# Patient Record
Sex: MALE | Race: WHITE | NOT HISPANIC OR LATINO | Employment: OTHER | URBAN - METROPOLITAN AREA
[De-identification: names, ages, dates, MRNs, and addresses within clinical notes are randomized per-mention and may not be internally consistent; named-entity substitution may affect disease eponyms.]

---

## 2017-02-01 ENCOUNTER — OFFICE VISIT - HEALTHEAST (OUTPATIENT)
Dept: FAMILY MEDICINE | Facility: CLINIC | Age: 62
End: 2017-02-01

## 2017-02-01 ENCOUNTER — COMMUNICATION - HEALTHEAST (OUTPATIENT)
Dept: FAMILY MEDICINE | Facility: CLINIC | Age: 62
End: 2017-02-01

## 2017-02-01 DIAGNOSIS — J40 BRONCHITIS: ICD-10-CM

## 2017-02-01 DIAGNOSIS — R53.82 CHRONIC FATIGUE: ICD-10-CM

## 2017-02-01 DIAGNOSIS — J98.01 BRONCHOSPASM: ICD-10-CM

## 2017-02-01 DIAGNOSIS — S46.212A STRAIN OF BICEPS TENDON, LEFT, INITIAL ENCOUNTER: ICD-10-CM

## 2017-04-19 ENCOUNTER — COMMUNICATION - HEALTHEAST (OUTPATIENT)
Dept: CARDIOLOGY | Facility: CLINIC | Age: 62
End: 2017-04-19

## 2017-04-21 ENCOUNTER — COMMUNICATION - HEALTHEAST (OUTPATIENT)
Dept: FAMILY MEDICINE | Facility: CLINIC | Age: 62
End: 2017-04-21

## 2017-04-21 DIAGNOSIS — E78.5 HYPERLIPEMIA: ICD-10-CM

## 2017-05-17 ENCOUNTER — OFFICE VISIT - HEALTHEAST (OUTPATIENT)
Dept: FAMILY MEDICINE | Facility: CLINIC | Age: 62
End: 2017-05-17

## 2017-05-17 ENCOUNTER — COMMUNICATION - HEALTHEAST (OUTPATIENT)
Dept: CARDIOLOGY | Facility: CLINIC | Age: 62
End: 2017-05-17

## 2017-05-17 DIAGNOSIS — I10 ESSENTIAL HYPERTENSION: ICD-10-CM

## 2017-05-17 ASSESSMENT — MIFFLIN-ST. JEOR: SCORE: 1538.3

## 2017-05-19 ENCOUNTER — OFFICE VISIT - HEALTHEAST (OUTPATIENT)
Dept: FAMILY MEDICINE | Facility: CLINIC | Age: 62
End: 2017-05-19

## 2017-05-19 DIAGNOSIS — M77.10: ICD-10-CM

## 2017-08-21 ENCOUNTER — OFFICE VISIT - HEALTHEAST (OUTPATIENT)
Dept: CARDIOLOGY | Facility: CLINIC | Age: 62
End: 2017-08-21

## 2017-08-21 DIAGNOSIS — I10 ESSENTIAL HYPERTENSION WITH GOAL BLOOD PRESSURE LESS THAN 130/80: ICD-10-CM

## 2017-08-21 DIAGNOSIS — G47.33 OBSTRUCTIVE SLEEP APNEA (ADULT) (PEDIATRIC): ICD-10-CM

## 2017-08-21 DIAGNOSIS — E11.9 TYPE 2 DIABETES MELLITUS WITHOUT COMPLICATION, WITHOUT LONG-TERM CURRENT USE OF INSULIN (H): ICD-10-CM

## 2017-08-21 DIAGNOSIS — E78.00 HYPERCHOLESTEREMIA: ICD-10-CM

## 2017-08-21 DIAGNOSIS — I25.83 CORONARY ATHEROSCLEROSIS DUE TO LIPID RICH PLAQUE: ICD-10-CM

## 2017-08-21 ASSESSMENT — MIFFLIN-ST. JEOR: SCORE: 1511.08

## 2017-08-25 ENCOUNTER — AMBULATORY - HEALTHEAST (OUTPATIENT)
Dept: CARDIOLOGY | Facility: CLINIC | Age: 62
End: 2017-08-25

## 2017-08-25 ENCOUNTER — COMMUNICATION - HEALTHEAST (OUTPATIENT)
Dept: CARDIOLOGY | Facility: CLINIC | Age: 62
End: 2017-08-25

## 2017-08-25 DIAGNOSIS — E78.00 HYPERCHOLESTEREMIA: ICD-10-CM

## 2017-08-25 DIAGNOSIS — E11.9 TYPE 2 DIABETES MELLITUS WITHOUT COMPLICATION, WITHOUT LONG-TERM CURRENT USE OF INSULIN (H): ICD-10-CM

## 2017-08-25 DIAGNOSIS — I25.83 CORONARY ATHEROSCLEROSIS DUE TO LIPID RICH PLAQUE: ICD-10-CM

## 2017-08-25 LAB
CHOLEST SERPL-MCNC: 164 MG/DL
FASTING STATUS PATIENT QL REPORTED: YES
HBA1C MFR BLD: 6.4 % (ref 4.2–6.1)
HDLC SERPL-MCNC: 34 MG/DL
LDLC SERPL CALC-MCNC: 99 MG/DL
TRIGL SERPL-MCNC: 157 MG/DL

## 2017-09-20 ENCOUNTER — COMMUNICATION - HEALTHEAST (OUTPATIENT)
Dept: FAMILY MEDICINE | Facility: CLINIC | Age: 62
End: 2017-09-20

## 2017-09-20 DIAGNOSIS — E78.5 HYPERLIPEMIA: ICD-10-CM

## 2018-05-18 ENCOUNTER — COMMUNICATION - HEALTHEAST (OUTPATIENT)
Dept: FAMILY MEDICINE | Facility: CLINIC | Age: 63
End: 2018-05-18

## 2018-05-18 DIAGNOSIS — R05.9 COUGH: ICD-10-CM

## 2018-05-23 ENCOUNTER — AMBULATORY - HEALTHEAST (OUTPATIENT)
Dept: FAMILY MEDICINE | Facility: CLINIC | Age: 63
End: 2018-05-23

## 2018-05-23 ENCOUNTER — OFFICE VISIT - HEALTHEAST (OUTPATIENT)
Dept: FAMILY MEDICINE | Facility: CLINIC | Age: 63
End: 2018-05-23

## 2018-05-23 ENCOUNTER — COMMUNICATION - HEALTHEAST (OUTPATIENT)
Dept: FAMILY MEDICINE | Facility: CLINIC | Age: 63
End: 2018-05-23

## 2018-05-23 DIAGNOSIS — E78.00 HYPERCHOLESTEREMIA: ICD-10-CM

## 2018-05-23 DIAGNOSIS — E11.9 DIABETES (H): ICD-10-CM

## 2018-05-23 DIAGNOSIS — E11.9 TYPE 2 DIABETES MELLITUS WITHOUT COMPLICATION, WITHOUT LONG-TERM CURRENT USE OF INSULIN (H): ICD-10-CM

## 2018-05-23 DIAGNOSIS — I10 ESSENTIAL HYPERTENSION: ICD-10-CM

## 2018-05-23 LAB
ALBUMIN SERPL-MCNC: 4 G/DL (ref 3.5–5)
ALP SERPL-CCNC: 55 U/L (ref 45–120)
ALT SERPL W P-5'-P-CCNC: 34 U/L (ref 0–45)
ANION GAP SERPL CALCULATED.3IONS-SCNC: 9 MMOL/L (ref 5–18)
AST SERPL W P-5'-P-CCNC: 22 U/L (ref 0–40)
BILIRUB SERPL-MCNC: 1.1 MG/DL (ref 0–1)
BUN SERPL-MCNC: 17 MG/DL (ref 8–22)
CALCIUM SERPL-MCNC: 9.6 MG/DL (ref 8.5–10.5)
CHLORIDE BLD-SCNC: 108 MMOL/L (ref 98–107)
CHOLEST SERPL-MCNC: 136 MG/DL
CO2 SERPL-SCNC: 26 MMOL/L (ref 22–31)
CREAT SERPL-MCNC: 1.12 MG/DL (ref 0.7–1.3)
FASTING STATUS PATIENT QL REPORTED: YES
GFR SERPL CREATININE-BSD FRML MDRD: >60 ML/MIN/1.73M2
GLUCOSE BLD-MCNC: 122 MG/DL (ref 70–125)
HBA1C MFR BLD: 6.7 % (ref 3.5–6)
HDLC SERPL-MCNC: 30 MG/DL
LDLC SERPL CALC-MCNC: 84 MG/DL
POTASSIUM BLD-SCNC: 4.7 MMOL/L (ref 3.5–5)
PROT SERPL-MCNC: 7.1 G/DL (ref 6–8)
SODIUM SERPL-SCNC: 143 MMOL/L (ref 136–145)
TRIGL SERPL-MCNC: 112 MG/DL

## 2018-05-23 ASSESSMENT — MIFFLIN-ST. JEOR: SCORE: 1515.05

## 2018-08-14 ENCOUNTER — COMMUNICATION - HEALTHEAST (OUTPATIENT)
Dept: FAMILY MEDICINE | Facility: CLINIC | Age: 63
End: 2018-08-14

## 2018-08-21 ENCOUNTER — OFFICE VISIT - HEALTHEAST (OUTPATIENT)
Dept: FAMILY MEDICINE | Facility: CLINIC | Age: 63
End: 2018-08-21

## 2018-08-21 DIAGNOSIS — R53.83 FATIGUE, UNSPECIFIED TYPE: ICD-10-CM

## 2018-08-21 DIAGNOSIS — I25.83 CORONARY ATHEROSCLEROSIS DUE TO LIPID RICH PLAQUE: ICD-10-CM

## 2018-08-21 ASSESSMENT — MIFFLIN-ST. JEOR: SCORE: 1515.05

## 2018-09-21 ENCOUNTER — COMMUNICATION - HEALTHEAST (OUTPATIENT)
Dept: ADMINISTRATIVE | Facility: CLINIC | Age: 63
End: 2018-09-21

## 2018-10-05 ENCOUNTER — COMMUNICATION - HEALTHEAST (OUTPATIENT)
Dept: FAMILY MEDICINE | Facility: CLINIC | Age: 63
End: 2018-10-05

## 2018-10-05 DIAGNOSIS — E78.5 HYPERLIPEMIA: ICD-10-CM

## 2018-10-16 ENCOUNTER — COMMUNICATION - HEALTHEAST (OUTPATIENT)
Dept: FAMILY MEDICINE | Facility: CLINIC | Age: 63
End: 2018-10-16

## 2018-12-10 ENCOUNTER — COMMUNICATION - HEALTHEAST (OUTPATIENT)
Dept: CARDIOLOGY | Facility: CLINIC | Age: 63
End: 2018-12-10

## 2018-12-10 DIAGNOSIS — I25.83 CORONARY ARTERY DISEASE DUE TO LIPID RICH PLAQUE: ICD-10-CM

## 2018-12-10 DIAGNOSIS — I25.10 CORONARY ARTERY DISEASE DUE TO LIPID RICH PLAQUE: ICD-10-CM

## 2019-02-26 ENCOUNTER — COMMUNICATION - HEALTHEAST (OUTPATIENT)
Dept: FAMILY MEDICINE | Facility: CLINIC | Age: 64
End: 2019-02-26

## 2019-06-25 ENCOUNTER — OFFICE VISIT - HEALTHEAST (OUTPATIENT)
Dept: FAMILY MEDICINE | Facility: CLINIC | Age: 64
End: 2019-06-25

## 2019-06-25 DIAGNOSIS — Z76.89 ENCOUNTER TO ESTABLISH CARE: ICD-10-CM

## 2019-06-25 DIAGNOSIS — Z00.00 ANNUAL PHYSICAL EXAM: ICD-10-CM

## 2019-06-25 DIAGNOSIS — E78.00 HYPERCHOLESTEREMIA: ICD-10-CM

## 2019-06-25 DIAGNOSIS — I25.83 CORONARY ATHEROSCLEROSIS DUE TO LIPID RICH PLAQUE: ICD-10-CM

## 2019-06-25 DIAGNOSIS — E11.9 TYPE 2 DIABETES MELLITUS WITHOUT COMPLICATION, WITHOUT LONG-TERM CURRENT USE OF INSULIN (H): ICD-10-CM

## 2019-06-25 DIAGNOSIS — Z23 IMMUNIZATION DUE: ICD-10-CM

## 2019-06-25 LAB
ANION GAP SERPL CALCULATED.3IONS-SCNC: 9 MMOL/L (ref 5–18)
BUN SERPL-MCNC: 17 MG/DL (ref 8–22)
CALCIUM SERPL-MCNC: 10 MG/DL (ref 8.5–10.5)
CHLORIDE BLD-SCNC: 108 MMOL/L (ref 98–107)
CHOLEST SERPL-MCNC: 140 MG/DL
CO2 SERPL-SCNC: 23 MMOL/L (ref 22–31)
CREAT SERPL-MCNC: 1.19 MG/DL (ref 0.7–1.3)
CREAT UR-MCNC: 189.9 MG/DL
FASTING STATUS PATIENT QL REPORTED: YES
GFR SERPL CREATININE-BSD FRML MDRD: >60 ML/MIN/1.73M2
GLUCOSE BLD-MCNC: 138 MG/DL (ref 70–125)
HBA1C MFR BLD: 6.7 % (ref 3.5–6)
HDLC SERPL-MCNC: 35 MG/DL
LDLC SERPL CALC-MCNC: 85 MG/DL
MICROALBUMIN UR-MCNC: 0.96 MG/DL (ref 0–1.99)
MICROALBUMIN/CREAT UR: 5.1 MG/G
POTASSIUM BLD-SCNC: 4.3 MMOL/L (ref 3.5–5)
SODIUM SERPL-SCNC: 140 MMOL/L (ref 136–145)
TRIGL SERPL-MCNC: 102 MG/DL

## 2019-06-25 ASSESSMENT — MIFFLIN-ST. JEOR: SCORE: 1511.65

## 2019-06-26 LAB — PSA SERPL-MCNC: 1.7 NG/ML (ref 0–4.5)

## 2019-07-10 ENCOUNTER — COMMUNICATION - HEALTHEAST (OUTPATIENT)
Dept: FAMILY MEDICINE | Facility: CLINIC | Age: 64
End: 2019-07-10

## 2019-07-10 ENCOUNTER — COMMUNICATION - HEALTHEAST (OUTPATIENT)
Dept: ADMINISTRATIVE | Facility: CLINIC | Age: 64
End: 2019-07-10

## 2019-07-10 DIAGNOSIS — E66.09 OBESITY DUE TO EXCESS CALORIES WITH SERIOUS COMORBIDITY, UNSPECIFIED CLASSIFICATION: ICD-10-CM

## 2019-07-11 ENCOUNTER — COMMUNICATION - HEALTHEAST (OUTPATIENT)
Dept: FAMILY MEDICINE | Facility: CLINIC | Age: 64
End: 2019-07-11

## 2019-07-12 ENCOUNTER — RECORDS - HEALTHEAST (OUTPATIENT)
Dept: ADMINISTRATIVE | Facility: OTHER | Age: 64
End: 2019-07-12

## 2019-07-15 ENCOUNTER — RECORDS - HEALTHEAST (OUTPATIENT)
Dept: HEALTH INFORMATION MANAGEMENT | Facility: CLINIC | Age: 64
End: 2019-07-15

## 2019-07-17 ENCOUNTER — RECORDS - HEALTHEAST (OUTPATIENT)
Dept: ADMINISTRATIVE | Facility: OTHER | Age: 64
End: 2019-07-17

## 2019-08-12 ENCOUNTER — HOSPITAL ENCOUNTER (OUTPATIENT)
Dept: NUTRITION | Facility: HOSPITAL | Age: 64
Discharge: HOME | End: 2019-08-12
Attending: FAMILY MEDICINE

## 2019-08-12 DIAGNOSIS — E66.09 OBESITY DUE TO EXCESS CALORIES WITH SERIOUS COMORBIDITY, UNSPECIFIED CLASSIFICATION: ICD-10-CM

## 2019-09-10 ENCOUNTER — OFFICE VISIT - HEALTHEAST (OUTPATIENT)
Dept: CARDIOLOGY | Facility: CLINIC | Age: 64
End: 2019-09-10

## 2019-09-10 DIAGNOSIS — I25.83 CORONARY ATHEROSCLEROSIS DUE TO LIPID RICH PLAQUE: ICD-10-CM

## 2019-09-10 DIAGNOSIS — E11.9 TYPE 2 DIABETES MELLITUS WITHOUT COMPLICATION, WITHOUT LONG-TERM CURRENT USE OF INSULIN (H): ICD-10-CM

## 2019-09-10 DIAGNOSIS — G47.33 OBSTRUCTIVE SLEEP APNEA (ADULT) (PEDIATRIC): ICD-10-CM

## 2019-09-10 DIAGNOSIS — E66.09 OBESITY DUE TO EXCESS CALORIES WITH SERIOUS COMORBIDITY, UNSPECIFIED CLASSIFICATION: ICD-10-CM

## 2019-09-10 DIAGNOSIS — E78.00 HYPERCHOLESTEREMIA: ICD-10-CM

## 2019-09-10 ASSESSMENT — MIFFLIN-ST. JEOR: SCORE: 1515.62

## 2019-09-17 ENCOUNTER — HOSPITAL ENCOUNTER (OUTPATIENT)
Dept: NUCLEAR MEDICINE | Facility: CLINIC | Age: 64
Discharge: HOME | End: 2019-09-17
Attending: INTERNAL MEDICINE

## 2019-09-17 ENCOUNTER — HOSPITAL ENCOUNTER (OUTPATIENT)
Dept: CARDIOLOGY | Facility: CLINIC | Age: 64
Discharge: HOME | End: 2019-09-17
Attending: INTERNAL MEDICINE

## 2019-09-17 DIAGNOSIS — I25.83 CORONARY ATHEROSCLEROSIS DUE TO LIPID RICH PLAQUE: ICD-10-CM

## 2019-09-17 DIAGNOSIS — E11.9 TYPE 2 DIABETES MELLITUS WITHOUT COMPLICATION, WITHOUT LONG-TERM CURRENT USE OF INSULIN (H): ICD-10-CM

## 2019-09-17 DIAGNOSIS — E78.00 HYPERCHOLESTEREMIA: ICD-10-CM

## 2019-09-17 LAB
CV STRESS CURRENT BP HE: NORMAL
CV STRESS CURRENT HR HE: 100
CV STRESS CURRENT HR HE: 102
CV STRESS CURRENT HR HE: 106
CV STRESS CURRENT HR HE: 106
CV STRESS CURRENT HR HE: 107
CV STRESS CURRENT HR HE: 109
CV STRESS CURRENT HR HE: 115
CV STRESS CURRENT HR HE: 120
CV STRESS CURRENT HR HE: 120
CV STRESS CURRENT HR HE: 125
CV STRESS CURRENT HR HE: 132
CV STRESS CURRENT HR HE: 132
CV STRESS CURRENT HR HE: 134
CV STRESS CURRENT HR HE: 135
CV STRESS CURRENT HR HE: 135
CV STRESS CURRENT HR HE: 67
CV STRESS CURRENT HR HE: 71
CV STRESS CURRENT HR HE: 89
CV STRESS CURRENT HR HE: 89
CV STRESS CURRENT HR HE: 91
CV STRESS CURRENT HR HE: 92
CV STRESS CURRENT HR HE: 94
CV STRESS CURRENT HR HE: 95
CV STRESS CURRENT HR HE: 96
CV STRESS CURRENT HR HE: 98
CV STRESS CURRENT HR HE: 99
CV STRESS DEVIATION TIME HE: NORMAL
CV STRESS ECHO PERCENT HR HE: NORMAL
CV STRESS EXERCISE STAGE HE: NORMAL
CV STRESS EXERCISE STAGE REACHED HE: NORMAL
CV STRESS FINAL RESTING BP HE: NORMAL
CV STRESS FINAL RESTING HR HE: 89
CV STRESS MAX HR HE: 135
CV STRESS MAX TREADMILL GRADE HE: 16
CV STRESS MAX TREADMILL SPEED HE: 4.2
CV STRESS PEAK DIA BP HE: NORMAL
CV STRESS PEAK SYS BP HE: NORMAL
CV STRESS PHASE HE: NORMAL
CV STRESS PROTOCOL HE: NORMAL
CV STRESS RESTING PT POSITION HE: NORMAL
CV STRESS RESTING PT POSITION HE: NORMAL
CV STRESS ST DEVIATION AMOUNT HE: NORMAL
CV STRESS ST DEVIATION ELEVATION HE: NORMAL
CV STRESS ST EVELATION AMOUNT HE: NORMAL
CV STRESS TEST TYPE HE: NORMAL
CV STRESS TOTAL STAGE TIME MIN 1 HE: NORMAL
NUC STRESS EJECTION FRACTION: 70 %
STRESS ECHO BASELINE BP: NORMAL
STRESS ECHO BASELINE HR: 65
STRESS ECHO CALCULATED PERCENT HR: 88 %
STRESS ECHO LAST STRESS BP: NORMAL
STRESS ECHO LAST STRESS HR: 135
STRESS ECHO POST ESTIMATED WORKLOAD: 11.7
STRESS ECHO POST EXERCISE DUR MIN: 10
STRESS ECHO POST EXERCISE DUR SEC: 0
STRESS ECHO TARGET HR: 131

## 2019-11-22 ENCOUNTER — COMMUNICATION - HEALTHEAST (OUTPATIENT)
Dept: FAMILY MEDICINE | Facility: CLINIC | Age: 64
End: 2019-11-22

## 2019-11-22 DIAGNOSIS — E78.5 HYPERLIPEMIA: ICD-10-CM

## 2020-01-10 ENCOUNTER — OFFICE VISIT - HEALTHEAST (OUTPATIENT)
Dept: FAMILY MEDICINE | Facility: CLINIC | Age: 65
End: 2020-01-10

## 2020-01-10 DIAGNOSIS — J02.9 SORE THROAT: ICD-10-CM

## 2020-01-10 DIAGNOSIS — R05.9 COUGH: ICD-10-CM

## 2020-01-10 DIAGNOSIS — R68.83 CHILLS: ICD-10-CM

## 2020-01-10 LAB
DEPRECATED S PYO AG THROAT QL EIA: NORMAL
FLUAV AG SPEC QL IA: NORMAL
FLUBV AG SPEC QL IA: NORMAL

## 2020-01-10 ASSESSMENT — MIFFLIN-ST. JEOR: SCORE: 1531.21

## 2020-01-11 LAB — GROUP A STREP BY PCR: NORMAL

## 2020-09-19 ENCOUNTER — COMMUNICATION - HEALTHEAST (OUTPATIENT)
Dept: FAMILY MEDICINE | Facility: CLINIC | Age: 65
End: 2020-09-19

## 2020-09-19 DIAGNOSIS — E78.5 HYPERLIPEMIA: ICD-10-CM

## 2020-10-05 ENCOUNTER — COMMUNICATION - HEALTHEAST (OUTPATIENT)
Dept: SCHEDULING | Facility: CLINIC | Age: 65
End: 2020-10-05

## 2020-10-05 ENCOUNTER — OFFICE VISIT - HEALTHEAST (OUTPATIENT)
Dept: FAMILY MEDICINE | Facility: CLINIC | Age: 65
End: 2020-10-05

## 2020-10-05 DIAGNOSIS — S63.91XA SPRAIN OF HAND, RIGHT, INITIAL ENCOUNTER: ICD-10-CM

## 2020-10-05 ASSESSMENT — MIFFLIN-ST. JEOR: SCORE: 1569.8

## 2020-11-23 ENCOUNTER — COMMUNICATION - HEALTHEAST (OUTPATIENT)
Dept: SCHEDULING | Facility: CLINIC | Age: 65
End: 2020-11-23

## 2020-11-24 ENCOUNTER — OFFICE VISIT - HEALTHEAST (OUTPATIENT)
Dept: FAMILY MEDICINE | Facility: CLINIC | Age: 65
End: 2020-11-24

## 2020-11-24 DIAGNOSIS — E78.00 HYPERCHOLESTEREMIA: ICD-10-CM

## 2020-11-24 DIAGNOSIS — E11.9 TYPE 2 DIABETES MELLITUS WITHOUT COMPLICATION, WITHOUT LONG-TERM CURRENT USE OF INSULIN (H): ICD-10-CM

## 2020-11-24 DIAGNOSIS — S63.91XA SPRAIN OF HAND, RIGHT, INITIAL ENCOUNTER: ICD-10-CM

## 2020-11-24 DIAGNOSIS — I25.83 CORONARY ATHEROSCLEROSIS DUE TO LIPID RICH PLAQUE: ICD-10-CM

## 2020-11-24 RX ORDER — MELOXICAM 15 MG/1
15 TABLET ORAL DAILY
Qty: 30 TABLET | Refills: 0 | Status: SHIPPED | OUTPATIENT
Start: 2020-11-24 | End: 2022-05-16

## 2020-11-24 ASSESSMENT — MIFFLIN-ST. JEOR: SCORE: 1554.14

## 2020-11-25 ENCOUNTER — AMBULATORY - HEALTHEAST (OUTPATIENT)
Dept: LAB | Facility: CLINIC | Age: 65
End: 2020-11-25

## 2020-11-25 DIAGNOSIS — E11.9 TYPE 2 DIABETES MELLITUS WITHOUT COMPLICATION, WITHOUT LONG-TERM CURRENT USE OF INSULIN (H): ICD-10-CM

## 2020-11-25 DIAGNOSIS — I25.83 CORONARY ATHEROSCLEROSIS DUE TO LIPID RICH PLAQUE: ICD-10-CM

## 2020-11-25 DIAGNOSIS — E78.00 HYPERCHOLESTEREMIA: ICD-10-CM

## 2020-11-25 LAB
ANION GAP SERPL CALCULATED.3IONS-SCNC: 11 MMOL/L (ref 5–18)
BUN SERPL-MCNC: 17 MG/DL (ref 8–22)
CALCIUM SERPL-MCNC: 8.8 MG/DL (ref 8.5–10.5)
CHLORIDE BLD-SCNC: 110 MMOL/L (ref 98–107)
CHOLEST SERPL-MCNC: 126 MG/DL
CO2 SERPL-SCNC: 21 MMOL/L (ref 22–31)
CREAT SERPL-MCNC: 1.12 MG/DL (ref 0.7–1.3)
FASTING STATUS PATIENT QL REPORTED: YES
GFR SERPL CREATININE-BSD FRML MDRD: >60 ML/MIN/1.73M2
GLUCOSE BLD-MCNC: 187 MG/DL (ref 70–125)
HBA1C MFR BLD: 8 %
HDLC SERPL-MCNC: 30 MG/DL
LDLC SERPL CALC-MCNC: 64 MG/DL
POTASSIUM BLD-SCNC: 4.1 MMOL/L (ref 3.5–5)
SODIUM SERPL-SCNC: 142 MMOL/L (ref 136–145)
TRIGL SERPL-MCNC: 160 MG/DL

## 2020-11-30 ENCOUNTER — COMMUNICATION - HEALTHEAST (OUTPATIENT)
Dept: FAMILY MEDICINE | Facility: CLINIC | Age: 65
End: 2020-11-30

## 2020-12-04 ENCOUNTER — AMBULATORY - HEALTHEAST (OUTPATIENT)
Dept: FAMILY MEDICINE | Facility: CLINIC | Age: 65
End: 2020-12-04

## 2020-12-04 DIAGNOSIS — E11.9 TYPE 2 DIABETES MELLITUS WITHOUT COMPLICATION, WITHOUT LONG-TERM CURRENT USE OF INSULIN (H): ICD-10-CM

## 2020-12-07 ENCOUNTER — COMMUNICATION - HEALTHEAST (OUTPATIENT)
Dept: CARDIOLOGY | Facility: CLINIC | Age: 65
End: 2020-12-07

## 2020-12-08 ENCOUNTER — OFFICE VISIT - HEALTHEAST (OUTPATIENT)
Dept: CARDIOLOGY | Facility: CLINIC | Age: 65
End: 2020-12-08

## 2020-12-08 DIAGNOSIS — E78.00 HYPERCHOLESTEREMIA: ICD-10-CM

## 2020-12-08 DIAGNOSIS — R73.09 OTHER ABNORMAL GLUCOSE: ICD-10-CM

## 2020-12-08 DIAGNOSIS — M19.90 OSTEOARTHRITIS: ICD-10-CM

## 2020-12-08 DIAGNOSIS — G47.33 OBSTRUCTIVE SLEEP APNEA (ADULT) (PEDIATRIC): ICD-10-CM

## 2020-12-08 DIAGNOSIS — I25.83 CORONARY ATHEROSCLEROSIS DUE TO LIPID RICH PLAQUE: ICD-10-CM

## 2020-12-08 ASSESSMENT — MIFFLIN-ST. JEOR: SCORE: 1549.39

## 2021-01-23 ENCOUNTER — COMMUNICATION - HEALTHEAST (OUTPATIENT)
Dept: FAMILY MEDICINE | Facility: CLINIC | Age: 66
End: 2021-01-23

## 2021-01-23 DIAGNOSIS — E78.5 HYPERLIPEMIA: ICD-10-CM

## 2021-02-05 ENCOUNTER — OFFICE VISIT - HEALTHEAST (OUTPATIENT)
Dept: FAMILY MEDICINE | Facility: CLINIC | Age: 66
End: 2021-02-05

## 2021-02-05 ENCOUNTER — RECORDS - HEALTHEAST (OUTPATIENT)
Dept: GENERAL RADIOLOGY | Facility: CLINIC | Age: 66
End: 2021-02-05

## 2021-02-05 DIAGNOSIS — M79.641 PAIN IN RIGHT HAND: ICD-10-CM

## 2021-02-05 DIAGNOSIS — M79.641 PAIN OF RIGHT HAND: ICD-10-CM

## 2021-02-08 ENCOUNTER — RECORDS - HEALTHEAST (OUTPATIENT)
Dept: ADMINISTRATIVE | Facility: OTHER | Age: 66
End: 2021-02-08

## 2021-04-07 ENCOUNTER — OFFICE VISIT - HEALTHEAST (OUTPATIENT)
Dept: FAMILY MEDICINE | Facility: CLINIC | Age: 66
End: 2021-04-07

## 2021-04-07 DIAGNOSIS — E78.5 HYPERLIPEMIA: ICD-10-CM

## 2021-04-07 DIAGNOSIS — E11.9 TYPE 2 DIABETES MELLITUS WITHOUT COMPLICATION, WITHOUT LONG-TERM CURRENT USE OF INSULIN (H): ICD-10-CM

## 2021-04-07 RX ORDER — ATORVASTATIN CALCIUM 40 MG/1
40 TABLET, FILM COATED ORAL DAILY
Qty: 90 TABLET | Refills: 3 | Status: SHIPPED | OUTPATIENT
Start: 2021-04-07 | End: 2022-01-18

## 2021-04-09 ENCOUNTER — AMBULATORY - HEALTHEAST (OUTPATIENT)
Dept: FAMILY MEDICINE | Facility: CLINIC | Age: 66
End: 2021-04-09

## 2021-04-09 ENCOUNTER — COMMUNICATION - HEALTHEAST (OUTPATIENT)
Dept: ADMINISTRATIVE | Facility: CLINIC | Age: 66
End: 2021-04-09

## 2021-04-09 DIAGNOSIS — E11.9 TYPE 2 DIABETES MELLITUS WITHOUT COMPLICATION, WITHOUT LONG-TERM CURRENT USE OF INSULIN (H): ICD-10-CM

## 2021-04-10 ENCOUNTER — AMBULATORY - HEALTHEAST (OUTPATIENT)
Dept: NURSING | Facility: CLINIC | Age: 66
End: 2021-04-10

## 2021-04-13 ENCOUNTER — OFFICE VISIT - HEALTHEAST (OUTPATIENT)
Dept: FAMILY MEDICINE | Facility: CLINIC | Age: 66
End: 2021-04-13

## 2021-04-13 ENCOUNTER — AMBULATORY - HEALTHEAST (OUTPATIENT)
Dept: NURSING | Facility: CLINIC | Age: 66
End: 2021-04-13

## 2021-04-13 DIAGNOSIS — E11.9 TYPE 2 DIABETES MELLITUS WITHOUT COMPLICATION, WITHOUT LONG-TERM CURRENT USE OF INSULIN (H): ICD-10-CM

## 2021-04-13 DIAGNOSIS — E78.5 HYPERLIPEMIA: ICD-10-CM

## 2021-04-13 DIAGNOSIS — R45.86 MOOD CHANGE: ICD-10-CM

## 2021-04-13 LAB
ANION GAP SERPL CALCULATED.3IONS-SCNC: 11 MMOL/L (ref 5–18)
BUN SERPL-MCNC: 14 MG/DL (ref 8–22)
CALCIUM SERPL-MCNC: 9.6 MG/DL (ref 8.5–10.5)
CHLORIDE BLD-SCNC: 107 MMOL/L (ref 98–107)
CHOLEST SERPL-MCNC: 129 MG/DL
CO2 SERPL-SCNC: 26 MMOL/L (ref 22–31)
CREAT SERPL-MCNC: 1.04 MG/DL (ref 0.7–1.3)
FASTING STATUS PATIENT QL REPORTED: YES
GFR SERPL CREATININE-BSD FRML MDRD: >60 ML/MIN/1.73M2
GLUCOSE BLD-MCNC: 99 MG/DL (ref 70–125)
HBA1C MFR BLD: 6 %
HDLC SERPL-MCNC: 37 MG/DL
LDLC SERPL CALC-MCNC: 67 MG/DL
POTASSIUM BLD-SCNC: 4.9 MMOL/L (ref 3.5–5)
SODIUM SERPL-SCNC: 144 MMOL/L (ref 136–145)
TRIGL SERPL-MCNC: 126 MG/DL

## 2021-04-14 ENCOUNTER — OFFICE VISIT - HEALTHEAST (OUTPATIENT)
Dept: FAMILY MEDICINE | Facility: CLINIC | Age: 66
End: 2021-04-14

## 2021-04-14 DIAGNOSIS — R63.4 WEIGHT LOSS: ICD-10-CM

## 2021-04-14 DIAGNOSIS — E11.9 TYPE 2 DIABETES MELLITUS WITHOUT COMPLICATION, WITHOUT LONG-TERM CURRENT USE OF INSULIN (H): ICD-10-CM

## 2021-04-14 LAB
BASOPHILS # BLD AUTO: 0.1 THOU/UL (ref 0–0.2)
BASOPHILS NFR BLD AUTO: 1 % (ref 0–2)
EOSINOPHIL # BLD AUTO: 0.2 THOU/UL (ref 0–0.4)
EOSINOPHIL NFR BLD AUTO: 3 % (ref 0–6)
ERYTHROCYTE [DISTWIDTH] IN BLOOD BY AUTOMATED COUNT: 13.4 % (ref 11–14.5)
HCT VFR BLD AUTO: 45 % (ref 40–54)
HGB BLD-MCNC: 14.7 G/DL (ref 14–18)
IMM GRANULOCYTES # BLD: 0 THOU/UL
IMM GRANULOCYTES NFR BLD: 0 %
LYMPHOCYTES # BLD AUTO: 2.5 THOU/UL (ref 0.8–4.4)
LYMPHOCYTES NFR BLD AUTO: 33 % (ref 20–40)
MCH RBC QN AUTO: 28.9 PG (ref 27–34)
MCHC RBC AUTO-ENTMCNC: 32.7 G/DL (ref 32–36)
MCV RBC AUTO: 89 FL (ref 80–100)
MONOCYTES # BLD AUTO: 0.6 THOU/UL (ref 0–0.9)
MONOCYTES NFR BLD AUTO: 7 % (ref 2–10)
NEUTROPHILS # BLD AUTO: 4.4 THOU/UL (ref 2–7.7)
NEUTROPHILS NFR BLD AUTO: 56 % (ref 50–70)
PLATELET # BLD AUTO: 198 THOU/UL (ref 140–440)
PMV BLD AUTO: 9.4 FL (ref 7–10)
RBC # BLD AUTO: 5.08 MILL/UL (ref 4.4–6.2)
TSH SERPL DL<=0.005 MIU/L-ACNC: 2.57 UIU/ML (ref 0.3–5)
WBC: 7.8 THOU/UL (ref 4–11)

## 2021-04-14 ASSESSMENT — MIFFLIN-ST. JEOR: SCORE: 1447.58

## 2021-04-16 ENCOUNTER — COMMUNICATION - HEALTHEAST (OUTPATIENT)
Dept: ADMINISTRATIVE | Facility: CLINIC | Age: 66
End: 2021-04-16

## 2021-04-20 ENCOUNTER — COMMUNICATION - HEALTHEAST (OUTPATIENT)
Dept: FAMILY MEDICINE | Facility: CLINIC | Age: 66
End: 2021-04-20

## 2021-04-29 ENCOUNTER — AMBULATORY - HEALTHEAST (OUTPATIENT)
Dept: FAMILY MEDICINE | Facility: CLINIC | Age: 66
End: 2021-04-29

## 2021-04-29 DIAGNOSIS — E78.00 HYPERCHOLESTEREMIA: ICD-10-CM

## 2021-05-27 VITALS — SYSTOLIC BLOOD PRESSURE: 122 MMHG | DIASTOLIC BLOOD PRESSURE: 78 MMHG

## 2021-05-29 ENCOUNTER — RECORDS - HEALTHEAST (OUTPATIENT)
Dept: ADMINISTRATIVE | Facility: CLINIC | Age: 66
End: 2021-05-29

## 2021-05-30 VITALS — BODY MASS INDEX: 29.29 KG/M2 | WEIGHT: 181.5 LBS

## 2021-05-30 NOTE — TELEPHONE ENCOUNTER
Patient called back requesting Dietician referral. Will close out this encounter and document in the other

## 2021-05-30 NOTE — TELEPHONE ENCOUNTER
Test Results  Who is calling?:  Patient  Who ordered the test:  Dr aNva  Type of test: Lab  Date of test:  6/25/19  Where was the test performed:  In clinic  What are your questions/concerns?:   Please send a result letter to his address on chart.     Writer spent time going over lab results and what effects results per patients questions.  Patient reports that he is going to eye doctor on Friday. He asked what the diabetic exam checks vs a annual visit at eye doctor.  Writer shared primarily with diabetes small vessel changes could be seen so they concentrate on blood flow to the eye. What diet is recommended to increase HDL?  Writer shared the carb counting, lots of veggies, fish or very small good cuts of beef.  No drive thru food or processed/fried foods.    Writer shared if he wanted help with meal planning a dietican consult can be ordered. He states he is going to discuss results with wife and call back if needed.   Okay to leave a detailed message?:

## 2021-05-30 NOTE — TELEPHONE ENCOUNTER
Who is calling:  Patient  Reason for Call:  Patient did not know he is actually diagnosed with  Diabetes( currently under control with diet & exercise - how ever his HGB A1c numbers are slowly getting higher )  Patients insurance requires letter, documentation of labs and  Copy of lab results, please make 2 copies of each, patient will need to  today. Please call and advise when complete.   Date of last appointment with primary care:  6/25/19  Has the patient been recently seen:  Yes  Okay to leave a detailed message: Yes - pt will be on his bicycle -please leave a message

## 2021-05-30 NOTE — TELEPHONE ENCOUNTER
Who is calling:  Patient  Reason for Call:  Patient states he would like to move forward with a Dietician.  Patient states he would like his wife to accompany him to the appointment with the Dietician. Patient would like a call sometime today 7/10/19.  Date of last appointment with primary care: 6/25/19  Okay to leave a detailed message: Yes

## 2021-05-31 VITALS — BODY MASS INDEX: 29.25 KG/M2 | HEIGHT: 66 IN | WEIGHT: 182 LBS

## 2021-05-31 VITALS — WEIGHT: 176 LBS | HEIGHT: 66 IN | BODY MASS INDEX: 28.28 KG/M2

## 2021-05-31 VITALS — BODY MASS INDEX: 29.14 KG/M2 | WEIGHT: 180.56 LBS

## 2021-05-31 NOTE — PROGRESS NOTES
"Nutrition Assessment    Patient seen for outpatient MNT initial assessment.    Referring diagnosis: Obesity due to excess calories with serious comorbidity, unspecified classification  Pt has Type 2 DM diet controlled.    Height: Height: 5' 6\" (1.676 m)  Weight: 176 lb  BMI:28.7  BMI indication: 25-29.9 overweight  Patient's Goal Weight: 170-175 lb    Labs: 6/25/19  Glucose 138 H  Hgb A1C 6.7 H    Lifestyle changes made prior to nutrition session:  Pt eats vegetables, fruit, whole grains, very little red meat     Assessment of diet/weight history: Pt eats healthy foods but, enjoys sweets, donuts, cookies (eats cookies and milk at night - has done this for many years), and large portions of foods like pasta.  Pt does not drink much water - drinks skim milk 3-4 gallons per week, and drinks gatorade when exercising.    Assessment of physical activity: Pt is physically active - bikes, walks    Nutrition intervention that addressed medical nutrition therapy for above diagnosis: Weight management through portion control.  We reviewed carbohydrate counting, limiting fat, making choices using healthy plate concept, and carbohydrate choices for meals/snacks    RD reviewed label reading, diabetes and physical activity.    Education materials provided: Goals sheet, diabetic placemat, Type 2 Diabetes Nutrition Therapy, Label reading, Weight Management Tips, Weight Management Cooking Tips    Goals: Pt's weight has been climbing, especially in the winter.  He would like to bring it down, and continue to control DM through diet, Hgb A1c is climbing as well.  Pt will decrease portions and be more mindful of night time snacking - making better choices.  Pt will increase water consumption especially when exercising (will dilute gatorade and drink plain water). Pt's wife is very supportive.    Patient had no barriers to learning, verbalized understanding, and compliance is expected.    Phone number provided for questions. Recommended " follow-up in as needed.    Thank you for this consult. Call me at [phone] for questions.

## 2021-06-01 VITALS — WEIGHT: 176 LBS | HEIGHT: 66 IN | BODY MASS INDEX: 28.28 KG/M2

## 2021-06-01 VITALS — HEIGHT: 66 IN | BODY MASS INDEX: 28.28 KG/M2 | WEIGHT: 176 LBS

## 2021-06-01 NOTE — PROGRESS NOTES
[facility] Follow-up Note    Assessment & Plan        1. Coronary atherosclerosis due to lipid rich plaque-patient has minimal coronary artery disease.  He underwent invasive angiogram August 2011 that showed only mild disease in the left anterior descending midportion.  He underwent repeat CT angiogram in 2013 which showed normal left main and insignificant left i anterior descending disease.  The circumflex and right coronary artery were normal.  We'll continue to work on prevention given strong family history and given increased shortness of breath will repeat nuclear stress test.   2. Type 2 diabetes mellitus without complication, without long-term current use of insulin (H) -hemoglobin A1c 6.7 and defer to primary.   3. Obstructive Sleep Apnea -he does not use CPAP.   4. Hypercholesteremia -on atorvastatin 40 mg and an LDL of 85 and HDL of 35 from June of this year, hesitant to increase further unless significant coronary disease as this will lower his HDL.   5. Obesity due to excess calories with serious comorbidity, unspecified classification -work on weight loss.   6.  Borderline essential hypertension-continue to monitor blood pressures.    Plan  1.  Exercise nuclear stress test and if ischemia angiography.  2.  Weight loss.  3.  Follow-up me one year or sooner if needed.    Subjective  CC: 64-year-old white gentleman being seen in yearly follow-up today.  Since I seen him he retired, lost his small spaniel/poodle mix dog, and hit another dog with his camper.  He is somewhat distraught and tells me he has had 2 episodes where he had a hard time catching his breath at rest.  Otherwise there is no chest discomfort, palpitations, PND, orthopnea, syncope or dizziness.    Medications  Current Outpatient Medications   Medication Sig     aspirin 81 mg chewable tablet Chew 81 mg daily.     atorvastatin (LIPITOR) 40 MG tablet Take 1 tablet (40 mg total) by mouth daily.       Objective  /80  "(Patient Site: Left Arm, Patient Position: Sitting, Cuff Size: Adult Regular)   Pulse 72   Resp 16   Ht 5' 6\" (1.676 m)   Wt 177 lb (80.3 kg)   BMI 28.57 kg/m      General Appearance:    Alert, cooperative, no distress, appears stated age   Head:    Normocephalic, without obvious abnormality, atraumatic   Throat:   Lips, mucosa, and tongue normal; teeth and gums normal   Neck:   Supple, symmetrical, trachea midline, no adenopathy;        thyroid:  No enlargement/tenderness/nodules; no carotid    bruit or JVD   Back:     Symmetric, no curvature, ROM normal, no CVA tenderness   Lungs:     Clear to auscultation bilaterally, respirations unlabored   Chest wall:    No tenderness or deformity   Heart:    Regular rate and rhythm, S1 and S2 normal, no murmur, rub   or gallop   Abdomen:     Soft, non-tender, bowel sounds active all four quadrants,     no masses, no organomegaly   Extremities:   Normal, atraumatic, no cyanosis or edema   Pulses:   2+ and symmetric all extremities   Skin:   Skin color, texture, turgor normal, no rashes or lesions     Results    Lab Results personally reviewed   Lab Results   Component Value Date    CHOL 140 06/25/2019    CHOL 136 05/23/2018     Lab Results   Component Value Date    HDL 35 (L) 06/25/2019    HDL 30 (L) 05/23/2018     Lab Results   Component Value Date    LDLCALC 85 06/25/2019    LDLCALC 84 05/23/2018     Lab Results   Component Value Date    TRIG 102 06/25/2019    TRIG 112 05/23/2018     Lab Results   Component Value Date    WBC 7.2 07/28/2016    HGB 15.9 07/28/2016    HCT 46.5 07/28/2016     07/28/2016     Lab Results   Component Value Date    CREATININE 1.19 06/25/2019    BUN 17 06/25/2019     06/25/2019    K 4.3 06/25/2019    CO2 23 06/25/2019     Review of Systems:   General: WNL  Eyes: WNL  Ears/Nose/Throat: WNL  Lungs: WNL  Heart: WNL  Stomach: WNL  Bladder: WNL  Muscle/Joints: WNL  Skin: WNL  Nervous System: WNL  Mental Health: WNL     Blood: WNL        "

## 2021-06-01 NOTE — PATIENT INSTRUCTIONS - HE
Mr Dimitri Almaraz,  I enjoyed visiting with you again today.  I am glad to hear you are doing well.  Per our conversation let us get the nuclear stress test.  I will plan on seeing you 1 year or sooner if issues.  Chin Suarez

## 2021-06-02 ENCOUNTER — RECORDS - HEALTHEAST (OUTPATIENT)
Dept: ADMINISTRATIVE | Facility: CLINIC | Age: 66
End: 2021-06-02

## 2021-06-03 VITALS
HEART RATE: 72 BPM | WEIGHT: 177 LBS | RESPIRATION RATE: 16 BRPM | DIASTOLIC BLOOD PRESSURE: 80 MMHG | SYSTOLIC BLOOD PRESSURE: 140 MMHG | HEIGHT: 66 IN | BODY MASS INDEX: 28.45 KG/M2

## 2021-06-03 VITALS — WEIGHT: 176.13 LBS | BODY MASS INDEX: 28.31 KG/M2 | HEIGHT: 66 IN

## 2021-06-03 NOTE — TELEPHONE ENCOUNTER
Refill Approved    Rx renewed per Medication Renewal Policy. Medication was last renewed on 10/5/18.    Yudith Boateng, [facility] Triage/Med Refill 11/22/2019     Requested Prescriptions   Pending Prescriptions Disp Refills     atorvastatin (LIPITOR) 40 MG tablet 90 tablet 3     Sig: Take 1 tablet (40 mg total) by mouth daily.       Statins Refill Protocol (Hmg CoA Reductase Inhibitors) Passed - 11/22/2019  1:06 AM        Passed - PCP or prescribing provider visit in past 12 months      Last office visit with prescriber/PCP: 5/19/2017 Helio Nava MD OR same dept: Visit date not found OR same specialty: 8/21/2018 Jai Robles MD  Last physical: 6/25/2019 Last MTM visit: Visit date not found   Next visit within 3 mo: Visit date not found  Next physical within 3 mo: Visit date not found  Prescriber OR PCP: Helio Nava MD  Last diagnosis associated with med order: 1. Hyperlipemia  - atorvastatin (LIPITOR) 40 MG tablet; Take 1 tablet (40 mg total) by mouth daily.  Dispense: 90 tablet; Refill: 3    If protocol passes may refill for 12 months if within 3 months of last provider visit (or a total of 15 months).

## 2021-06-04 VITALS
RESPIRATION RATE: 18 BRPM | DIASTOLIC BLOOD PRESSURE: 66 MMHG | HEIGHT: 66 IN | HEART RATE: 76 BPM | BODY MASS INDEX: 29 KG/M2 | TEMPERATURE: 98.3 F | WEIGHT: 180.44 LBS | SYSTOLIC BLOOD PRESSURE: 118 MMHG | OXYGEN SATURATION: 96 %

## 2021-06-05 VITALS
BODY MASS INDEX: 29.82 KG/M2 | DIASTOLIC BLOOD PRESSURE: 80 MMHG | WEIGHT: 185.56 LBS | HEIGHT: 66 IN | TEMPERATURE: 97.9 F | RESPIRATION RATE: 18 BRPM | SYSTOLIC BLOOD PRESSURE: 132 MMHG | HEART RATE: 80 BPM

## 2021-06-05 VITALS
WEIGHT: 162 LBS | SYSTOLIC BLOOD PRESSURE: 116 MMHG | TEMPERATURE: 97.9 F | DIASTOLIC BLOOD PRESSURE: 62 MMHG | OXYGEN SATURATION: 98 % | BODY MASS INDEX: 26.03 KG/M2 | HEIGHT: 66 IN | HEART RATE: 62 BPM

## 2021-06-05 VITALS
TEMPERATURE: 98.1 F | WEIGHT: 189 LBS | DIASTOLIC BLOOD PRESSURE: 60 MMHG | HEIGHT: 66 IN | SYSTOLIC BLOOD PRESSURE: 122 MMHG | BODY MASS INDEX: 30.37 KG/M2 | OXYGEN SATURATION: 96 % | HEART RATE: 67 BPM | RESPIRATION RATE: 16 BRPM

## 2021-06-05 VITALS
WEIGHT: 184.5 LBS | RESPIRATION RATE: 20 BRPM | BODY MASS INDEX: 29.65 KG/M2 | SYSTOLIC BLOOD PRESSURE: 121 MMHG | DIASTOLIC BLOOD PRESSURE: 70 MMHG | HEIGHT: 66 IN | HEART RATE: 64 BPM

## 2021-06-05 NOTE — PROGRESS NOTES
ASSESSMENT AND PLAN:  1. Sore throat  Rapid strep test is negative culture rules are pending  - Rapid Strep A Screen- Throat Swab  - Group A Strep, RNA Direct Detection, Throat    2. Chills    Influenza test was negative  - Influenza A/B Rapid Test- Nasal Swab    3. Cough    Medication given for cough differential would include a viral infection respiratory exam is unremarkable.  If he develops a fever he needs to contact me again.  - benzonatate (TESSALON) 200 MG capsule; Take 1 capsule (200 mg total) by mouth 3 (three) times a day as needed for cough.  Dispense: 21 capsule; Refill: 0          Orders Placed This Encounter   Procedures     Rapid Strep A Screen- Throat Swab     Influenza A/B Rapid Test- Nasal Swab     Group A Strep, RNA Direct Detection, Throat     There are no discontinued medications.    No follow-ups on file.    CHIEF COMPLAINT:  Chief Complaint   Patient presents with     Sore Throat     Cough     Nasal Congestion       HISTORY OF PRESENT ILLNESS:  Dimitri is a 64 y.o. male who presents to the clinic today for sore throat, cough, and nasal congestion. Onset of symptoms was about 4 days ago. He has had sore throat, cough, nasal drainage/congestion, and sinus headache. The patient has been fatigued in the evening, and has been going to bed around 7pm. He has been taking aspirin and Zicam. His appetite has been okay. There has been no nausea, emesis, abdominal pain, body aches, or pain with biting/chewing. Dimitri was recently in a reddy work environment. He did not get a flu shot this season. His wife has not been sick.     REVIEW OF SYSTEMS:   ENT: Sore throat. Nasal drainage and congestion.   All other systems are negative.    PFSH:  He is . 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 Reviewed as below.     TOBACCO USE:  Social History     Tobacco Use   Smoking Status Former Smoker     Packs/day: 0.25     Years: 40.00     Pack years: 10.00     Types:  "Cigarettes     Last attempt to quit: 5/10/2019     Years since quittin.6   Smokeless Tobacco Never Used   Tobacco Comment    1 pack per week       VITALS:  Vitals:    01/10/20 1320   BP: 118/66   Pulse: 76   Resp: 18   Temp: 98.3  F (36.8  C)   TempSrc: Oral   SpO2: 96%   Weight: 180 lb 7 oz (81.8 kg)   Height: 5' 6\" (1.676 m)     Wt Readings from Last 3 Encounters:   01/10/20 180 lb 7 oz (81.8 kg)   09/10/19 177 lb (80.3 kg)   19 176 lb 2 oz (79.9 kg)     Body mass index is 29.12 kg/m .    PHYSICAL EXAM:  General: Alert, cooperative, no distress, appears stated age  Head: Normocephalic, without obvious abnormality, atraumatic  Eyes: PERRL, conjunctiva/cornea clear, EOM's intact  Ears: Normal TM's and external ear canals, both ears  Nose: Nares normal, septum midline, mucosa normal, no drainage or sinus tenderness  Throat: Lips, mucosa, and tongue normal; teeth and gums normal, posterior of pharyngeal wall is erythematous without exudate  Neck: Supple, bilateral anterior cervical lymph node enlargement   Lungs: Clear to auscultation bilaterally, respirations unlabored  Heart: Regular rate and rhythm, S1 and S2 normal, no murmur, rub, or gallop  Neurologic:  A & O x 3.  No tremor, no focal findings.   Normal gait.   Psychiatric: Normal affect, good eye contact, adequately groomed  Skin: No rash or suspicious lesions noted on exposed skin, non-diaphoretic    LABS:  Recent Results (from the past 24 hour(s))   Rapid Strep A Screen- Throat Swab    Collection Time: 01/10/20  1:31 PM   Result Value Ref Range    Rapid Strep A Antigen No Group A Strep detected, presumptive negative No Group A Strep detected, presumptive negative   Influenza A/B Rapid Test- Nasal Swab    Collection Time: 01/10/20  1:31 PM   Result Value Ref Range    Influenza  A, Rapid Antigen No Influenza A antigen detected No Influenza A antigen detected    Influenza B, Rapid Antigen No Influenza B antigen detected No Influenza B antigen detected "       DATA REVIEWED:  Additional History from Old Records Summarized (2): Reviewed orthopedics note from [facility] regarding knee pain.  Decision to Obtain Records (1): none  Radiology Tests Summarized or Ordered (1): X-rays of knee revealed no effusion  Labs Reviewed or Ordered (1): Labs ordered.  Medicine Test Summarized or Ordered (1): none  Independent Review of EKG or X-RAY(2 each): none    IDana, am scribing for and in the presence of, Dr. Nava.    I, Dr. Nava, personally performed the services described in this documentation, as scribed by Dana Solis in my presence, and it is both accurate and complete.      MEDICATIONS:  Current Outpatient Medications   Medication Sig Dispense Refill     aspirin 81 mg chewable tablet Chew 81 mg daily.       atorvastatin (LIPITOR) 40 MG tablet Take 1 tablet (40 mg total) by mouth daily. 90 tablet 2     benzonatate (TESSALON) 200 MG capsule Take 1 capsule (200 mg total) by mouth 3 (three) times a day as needed for cough. 21 capsule 0     No current facility-administered medications for this visit.        Total Data Points: 4    Please note that this clinical encounter uses voice recognition software, there may be typographical errors present

## 2021-06-05 NOTE — PATIENT INSTRUCTIONS - HE
Viral upper respiratory infection with cough:     Start benzonatate (TESSALON) 200 MG capsule; Take 1 capsule (200 mg total) by mouth 3 (three) times a day as needed for cough.     Rest.    Drink plenty of fluids.    Return as needed for onset of high fever or worsening respiratory symptoms.

## 2021-06-08 NOTE — PROGRESS NOTES
Subjective:   Saw presents with a 1 month history of head congestion and cough.  He's had some sweats and chills at home.  He has had a sore throat.  This started around Mina and he thought he was getting better but then last week symptoms worsened once again.  His energy is very low.  He feels very fatigued.  He has a productive cough.  Cough is productive of green phlegm.  Cough seems to worsen at nighttime.  He will feel winded with heavy activity.  He still has body aches.  Occasionally his ears well pain and plugged.  He is also projected left arm.  Has been having pain off and on over the last year in the arm.  He does any heavy lifting he will tend to get more pain.  He denies any known injury to the area.  He's had no swelling or rash in the area.  Pain is worse with activity.  It does not bother his sleep.      Objective:  HEENT: Both TMs are gray.  No erythema noted.  Conjunctiva are clear.  The sinuses are minimally tender.  The nasal mucosa does have erythema.  Minimal exudate noted.  Fair air flow noted.  The pharynx has mild erythema but no exudate.  Uvula is midline.  Neck: Neck reveals minimal anterior lymphadenopathy.  Small posterior nodes present.  These are nontender.  Lungs: There are bronchial sounds heard anteriorly.  There are expiratory wheezes heard bilaterally with forced expiration only.  No inspiratory wheezes heard.  No rales are present.  The patient is in no respiratory distress at rest.  Cardiac: No murmur heard.  Abdomen: Abdomen is soft and nontender.  Extremities: The left arm has palpable tenderness over both long and short head of the biceps.  The biceps body is minimally tender.  Distal tendons are minimally tender.  The patient has full range of motion of shoulder, elbow and wrist.  The lateral condyle is nontender today.      Assessment:  1.  Bronchitis with bronchospasm  2.  Fatigue.  Must rule out mono with recent sore throat.  3.  Biceps tendon strain      Plan:  The  patient will start ibuprofen and Tylenol for pain control of his I's and tendon.  I gave him some stretching exercises.  He'll ice this area at the end of the day.  He will start Augmentin 875 mg twice daily for his bronchitis symptoms.  He will take extra liquids.  He will get plenty of rest and get good nutrition as well.  Monospot is done today.  He will be called if this is abnormal.  We discussed mono at length today and what symptoms that causes.  He was told that treatment of mono includes lots of rest and good nutrition.

## 2021-06-10 NOTE — PROGRESS NOTES
Subjective:   Saw comes in today for check of his blood pressure.  His dentist has noticed that his pressure has been going up recently.  They wanted him to be evaluated for this  He denies any high blood pressure symptoms such as headache or palpitations.  He denies chest pains.  He does have a strong family history of heart disease.  He himself also has a history of hyperlipidemia.  He exercises routinely.  He likes to cycle.  This weekend he went 39 miles on the bike.  He is not on any blood pressure type medications.  He states he eats a low-sodium diet.  He has gained weight over the last year.      Objective:  HEENT: Fundi appear benign.  EOMs are full.  Pharynx clear.  Neck: No increased JVD noted.  Cardiac: There is a regular rhythm present.  No murmur heard today.  No ectopy present.  Extremities: Pulses are strong in upper and lower extremities.  No edema noted in the feet today.      Assessment:  1.  Hypertension which appears controlled with diet and exercise.  Some blood pressures have been elevated at the dental office.      Plan:  The patient will monitor blood pressure closely.  He will come in here once every week or 2 to have a blood pressure checked.  If we see a trend of higher blood pressures then starting a medication may be indicated.  He will continue his exercise program.  He will become more serious about losing weight as this will help.  He will not add salt to his diet.  Follow-up sooner if he notes any new symptoms.

## 2021-06-10 NOTE — PROGRESS NOTES
ASSESSMENT AND PLAN:  1. Epicondylitis, lateral Dimitri has epicondylitis of the left elbow.  He has not treated it.  It is mildly tender.  We discussed options and I believe short course of anti-inflammatories with ice and possible Biofreeze would help if his symptoms do not resolve after approximately 3 to weeks he can return for steroid injection he has had this symptom for approximately 4-6 months         CHIEF COMPLAINT:  Chief Complaint   Patient presents with     Elbow Pain     x6 months, L elbow.       HISTORY OF PRESENT ILLNESS:  Dimitri is a 62 y.o. male presenting with elbow pain. For the past 6 months he has been experiencing left elbow pain. If he internally rotates his left arm, his pain radiates up his arm. He does not feel clicking in his arm often. He notes that his elbow locked up yesterday and his pain worsened. He has not used medication for the pain. He denies playing tennis or golf. He is right hand dominant.     REVIEW OF SYSTEMS:   He denies shoulder pain.   All other 10 point review of systems are negative.    PFSH:  Reviewed as below.     TOBACCO USE:  History   Smoking Status     Light Tobacco Smoker     Packs/day: 0.25     Years: 40.00     Types: Cigarettes   Smokeless Tobacco     Never Used     Comment: 2 cig/day       VITALS:  Vitals:    05/19/17 0950   BP: 126/80   Patient Site: Right Arm   Patient Position: Sitting   Cuff Size: Adult Regular   Pulse: 68   Resp: 20   Temp: 98.1  F (36.7  C)   TempSrc: Oral   Weight: 180 lb 9 oz (81.9 kg)     Wt Readings from Last 3 Encounters:   05/19/17 180 lb 9 oz (81.9 kg)   05/17/17 182 lb (82.6 kg)   02/01/17 181 lb 8 oz (82.3 kg)     Body mass index is 29.14 kg/(m^2).     PHYSICAL EXAM:  General: Alert, cooperative, no distress, appears stated age  Musculoskeletal: Tenderness over left lateral epicondyle  Neurologic:  A & O x 3.  No tremor, no focal findings.       DATA REVIEWED:  Additional History from Old Records Summarized (2): None  Decision  to Obtain Records (1): None  Radiology Tests Summarized or Ordered (1): None  Labs Reviewed or Ordered (1): None  Medicine Test Summarized or Ordered (1): None  Independent Review of EKG or X-RAY(2 each): None    The visit lasted a total of 10 minutes face to face with the patient. Over 50% of the time was spent counseling and educating the patient about epicondylitis.     IChristine, am scribing for and in the presence of, Dr. Nava.    IDr. Nava, personally performed the services described in this documentation, as scribed by Christine Lepe in my presence, and it is both accurate and complete.      MEDICATIONS:  Current Outpatient Prescriptions   Medication Sig Dispense Refill     aspirin 81 mg chewable tablet Chew 81 mg daily.       atorvastatin (LIPITOR) 40 MG tablet TAKE 1 TABLET (40 MG) BY ORAL ROUTE ONCE DAILY 90 tablet 0     cholecalciferol, vitamin D3, (VITAMIN D3) 1,000 unit capsule Take 1,000 Units by mouth daily.       No current facility-administered medications for this visit.        Total Data Points: 0

## 2021-06-11 NOTE — TELEPHONE ENCOUNTER
Refill Approved    Rx renewed per Medication Renewal Policy. Medication was last renewed on 11/22/19.    Yudith Boateng, [facility] Triage/Med Refill 9/21/2020     Requested Prescriptions   Pending Prescriptions Disp Refills     atorvastatin (LIPITOR) 40 MG tablet [Pharmacy Med Name: Atorvastatin Calcium Oral Tablet 40 MG] 90 tablet 0     Sig: Take 1 tablet (40 mg total) by mouth daily.       Statins Refill Protocol (Hmg CoA Reductase Inhibitors) Passed - 9/19/2020  2:00 AM        Passed - PCP or prescribing provider visit in past 12 months      Last office visit with prescriber/PCP: 1/10/2020 Helio Nava MD OR same dept: 1/10/2020 Helio Nava MD OR same specialty: 1/10/2020 Helio Nava MD  Last physical: 6/25/2019 Last MTM visit: Visit date not found   Next visit within 3 mo: Visit date not found  Next physical within 3 mo: Visit date not found  Prescriber OR PCP: Helio Nava MD  Last diagnosis associated with med order: 1. Hyperlipemia  - atorvastatin (LIPITOR) 40 MG tablet [Pharmacy Med Name: Atorvastatin Calcium Oral Tablet 40 MG]; Take 1 tablet (40 mg total) by mouth daily.  Dispense: 90 tablet; Refill: 0    If protocol passes may refill for 12 months if within 3 months of last provider visit (or a total of 15 months).

## 2021-06-12 NOTE — PROGRESS NOTES
"HPI = 65-year-old male here with right hand pain and swelling  .  He works as a .  10 days ago on the weekend he did a lot of heavy lifting with moving a quart of wood as well as patio decking.  He works so much that he wore out a pair of gloves.  Since then he has had right hand pain and swelling over the dorsal side of the knuckles of his third fourth and fifth knuckles.  He has difficulty fully gripping and grasping with the right hand.  He is tried ice, aspirin, ibuprofen, Tylenol, and the topical balm. he said when he was wearing gloves when he was at a cabin last weekend the compression helped with some comfort.    He has a history of diabetes his last A1c and LDL were good and completed in June 2019.  He also had a stress test that was normal in September 2019.      Patient Active Problem List   Diagnosis     Hyperglycemia     Hypercholesteremia     Obesity     Obstructive Sleep Apnea     Coronary Artery Disease     Benign Polyps Of The Large Intestine     Diverticulosis     Osteoarthritis     Type 2 diabetes mellitus without complication (H)       Vitals:    10/05/20 1426   BP: 122/60   Pulse: 67   Resp: 16   Temp: 98.1  F (36.7  C)   TempSrc: Oral   SpO2: 96%   Weight: 189 lb (85.7 kg)   Height: 5' 5.98\" (1.676 m)     PHYSICAL EXAM   General Appearance: Awake and alert, in no acute distress  HEENT: neck is supple  CV: regular rate  Resp: No respiratory distress. Breathing comfortably  Musculoskeletal: Right hand with some mild swelling and warmth on the dorsal side, no redness, no bony tenderness,  is 3 out of 5 compared to 5 out of 5 on the other side, he has discomfort with spreading his fingers are turning them to the side  Skin: no rashes noted    A/P  1. Sprain of hand, right, initial encounter  Discussed continuing ice, gloves for compression, rest as much as he can but that is limited because he still works as a .  Advised to follow-up if symptoms are not improving as he may " need hand therapy  - naproxen (NAPROSYN) 500 MG tablet; Take 1 tablet (500 mg total) by mouth 2 (two) times a day with meals.  Dispense: 40 tablet; Refill: 0      Diabetes mellitus.   Diet controlled and he was uninterested in doing any diabetic labs today.  He was encouraged to follow-up with his primary care doctor for this

## 2021-06-12 NOTE — PROGRESS NOTES
[facility] Follow-up Note    Assessment & Plan        1. Coronary atherosclerosis due to lipid rich plaque-patient has minimal coronary artery disease.  He underwent invasive angiogram August 2011 that showed only mild disease about the left into descending midportion.  He underwent repeat CT angiogram in 2013 which showed normal left main and insignificant left into descending disease.  The circumflex right coronary artery were normal.  We'll continue to work on prevention given strong family history will discuss CTA or nuclear stress test in March.   2. Hypercholesteremia -cholesterol good at 153 with an LDL of 84.  We will plan on rechecking.   3. Type 2 diabetes mellitus without complication, without long-term current use of insulin -defer to primary with sugars running slightly higher than 125 and hemoglobin A1c slightly higher than 6.0.  Will however arrange for recheck this Friday.   4. Essential hypertension with goal blood pressure less than 130/80 -under good control today but I have asked him to keep an tabs on this and a blood pressure elevated willing to start medications.   5. Obstructive Sleep Apnea -does not use CPAP.     Plan  1.  Fasting lipids with hemoglobin A1c and renal profile this Friday.  2.  Will arrange for CTA or possible stress testing on March 2018.  3.  Follow-up with me in 1 year or sooner if needed.    Subjective  CC: 62-year-old white gentleman being seen in a yearly follow-up today.  He comes in feeling well.  He has been active riding his motorcycle as well as bicycle. Patient complains of no syncope, dizziness, fatigue, fevers, chest pain, palpitations, shortness of breath, PND, orthopnea, nausea, vomiting, or edema.    Medications  Current Outpatient Prescriptions   Medication Sig     aspirin 81 mg chewable tablet Chew 81 mg daily.     atorvastatin (LIPITOR) 40 MG tablet TAKE 1 TABLET (40 MG) BY ORAL ROUTE ONCE DAILY     cholecalciferol, vitamin D3, (VITAMIN  "D3) 1,000 unit capsule Take 1,000 Units by mouth daily.       Objective  /76 (Patient Site: Right Arm, Patient Position: Sitting, Cuff Size: Adult Regular)  Pulse 70  Resp 18  Ht 5' 6\" (1.676 m)  Wt 176 lb (79.8 kg)  SpO2 96%  BMI 28.41 kg/m2    General Appearance:    Alert, cooperative, no distress, appears stated age   Head:    Normocephalic, without obvious abnormality, atraumatic   Throat:   Lips, mucosa, and tongue normal; teeth and gums normal   Neck:   Supple, symmetrical, trachea midline, no adenopathy;        thyroid:  No enlargement/tenderness/nodules; no carotid    bruit or JVD   Back:     Symmetric, no curvature, ROM normal, no CVA tenderness   Lungs:     Clear to auscultation bilaterally, respirations unlabored   Chest wall:    No tenderness or deformity   Heart:    Regular rate and rhythm, S1 and S2 normal, no murmur, rub   or gallop   Abdomen:     Soft, non-tender, bowel sounds active all four quadrants,     no masses, no organomegaly   Extremities:   Normal, atraumatic, no cyanosis or edema   Pulses:   2+ and symmetric all extremities   Skin:   Skin color, texture, turgor normal, no rashes or lesions     Results    Lab Results personally reviewed   Lab Results   Component Value Date    CHOL 153 07/28/2016    CHOL 196 06/19/2014     Lab Results   Component Value Date    HDL 38 (L) 07/28/2016    HDL 39 (L) 06/19/2014     Lab Results   Component Value Date    LDLCALC 84 07/28/2016    LDLCALC 124 06/19/2014     Lab Results   Component Value Date    TRIG 155 (H) 07/28/2016    TRIG 165 (H) 06/19/2014     Lab Results   Component Value Date    WBC 7.2 07/28/2016    HGB 15.9 07/28/2016    HCT 46.5 07/28/2016     07/28/2016     Lab Results   Component Value Date    CREATININE 1.16 07/28/2016    BUN 17 07/28/2016     07/28/2016    K 4.4 07/28/2016    CO2 27 07/28/2016     Review of Systems:   General: WNL  Eyes: WNL  Ears/Nose/Throat: WNL  Lungs: WNL  Heart: WNL  Stomach: WNL  Bladder: " WNL  Muscle/Joints: WNL  Skin: WNL  Nervous System: WNL  Mental Health: WNL     Blood: WNL

## 2021-06-12 NOTE — TELEPHONE ENCOUNTER
Dimitri was moving firewoodand concrete blocks and right hand  fingers first three are really sore and swollen and feels like arthritis.  Taking aspirin and icing and is not getting better.  Fingers are swollen.  Dimitri is requesting an appointment for today.    COVID 19 Nurse Triage Plan/Patient Instructions    Please be aware that novel coronavirus (COVID-19) may be circulating in the community. If you develop symptoms such as fever, cough, or SOB or if you have concerns about the presence of another infection including coronavirus (COVID-19), please contact your health care provider or visit [email or web address].     Disposition/Instructions    In-Person Visit with provider recommended. Reference Visit Selection Guide.    Thank you for taking steps to prevent the spread of this virus.  o Limit your contact with others.  o Wear a simple mask to cover your cough.  o Wash your hands well and often.    Resources    [facility]: About COVID-19: [email or web address]    CDC: What to Do If You're Sick: www.cdc.gov/coronavirus/2019-ncov/about/steps-when-sick.html    CDC: Ending Home Isolation: www.cdc.gov/coronavirus/2019-ncov/hcp/disposition-in-home-patients.html     CDC: Caring for Someone: www.cdc.gov/coronavirus/2019-ncov/if-you-are-sick/care-for-someone.html     [facility]: Interim Guidance for Hospital Discharge to Home: www.health.[facility].[place]/diseases/coronavirus/hcp/hospdischarge.pdf    [facility] clinical trials (COVID-19 research studies): [email or web address]     Below are the COVID-19 hotlines at the [place] Department of Health ([facility]). Interpreters are available.   o For health questions: Call 719-017-9577 or 1-471.626.3695 (7 a.m. to 7 p.m.)  o For questions about schools and childcare: Call 995-746-4561 or 1-698.582.5260 (7 a.m. to 7 p.m.)       Additional Information    Negative: [1] Swollen joint AND [2] fever    Negative: [1] Looks infected (spreading redness, red  streak, pus) AND [2] fever    Negative: [1] Looks infected (spreading redness, red streak, pus) AND [2] severe pain with movement    Negative: Patient sounds very sick or weak to the triager    Negative: [1] Looks infected (spreading redness, red streak, pus) AND [2] large red area (more than 2 in or 5 cm, or entire finger)    [1] SEVERE pain (e.g., excruciating) AND [2] not improved after 2 hours of pain medicine    Protocols used: FINGER PAIN-A-AH

## 2021-06-13 NOTE — TELEPHONE ENCOUNTER
Wants lab results from 11/25/2020  Would like a call 12/1/2020   Ok to leave detailed message       [phone] Saw

## 2021-06-13 NOTE — PROGRESS NOTES
Spoke to patient today regarding lab tests discussed lipid panel BMP and A1c his A1c is highly elevated suggested he start Metformin follow-up in 3 months to recheck A1c at that time

## 2021-06-13 NOTE — PROGRESS NOTES
ASSESSMENT and plan   1. Sprain of hand, right, initial encounter  No tenderness elicited when I palpated his right hand at the MCP and PIP joints today however he does have some changes noted and there is swelling on those joints.  He cannot close his hand.  Of asked him to try meloxicam.  - meloxicam (MOBIC) 15 MG tablet; Take 1 tablet (15 mg total) by mouth daily.  Dispense: 30 tablet; Refill: 0    2. Coronary atherosclerosis due to lipid rich plaque    Schedule see cardiology we will get a lipid panel tomorrow  - Lipid [place] FASTING; Future    3. Hypercholesteremia    He is change his diet.  - Basic Metabolic Panel; Future    4. Type 2 diabetes mellitus without complication, without long-term current use of insulin (H)    He does not check his blood sugar is been more than a year since his last A1c will check that today.  - Glycosylated Hemoglobin A1c; Future      There are no Patient Instructions on file for this visit.    Orders Placed This Encounter   Procedures     Basic Metabolic Panel     Standing Status:   Future     Standing Expiration Date:   11/24/2021     Lipid [place] FASTING     Standing Status:   Future     Standing Expiration Date:   11/24/2021     Order Specific Question:   Fasting is required?     Answer:   Yes     Glycosylated Hemoglobin A1c     Standing Status:   Future     Standing Expiration Date:   11/24/2021     Medications Discontinued During This Encounter   Medication Reason     naproxen (NAPROSYN) 500 MG tablet Therapy completed       No follow-ups on file.    CHIEF COMPLAINT:  Chief Complaint   Patient presents with     Hand Pain       HISTORY OF PRESENT ILLNESS:  Dimitri is a 65 y.o. male who is here today for a follow-up, he saw Dr. Clarke approximately 6 weeks ago for right-sided hand pain he reports that with the naproxen his symptoms have improved he ran out of the medication some time ago and notes that the tendon still stiff.  He denies any injuries to the site.  He says he  "cannot close his hand.  He says the pain does bother him and he can get comfortable at night and is wondering if we can do something about this.  He does not think he is injured the hand.  He also needs labs drawn before his cardiology visit in approximately a week and a half.  He is not fasting today and would like to come in when he is fasting he states he does not want a flu shot today.  He has not been checking his blood sugars regularly needed does that controlled by diet only    REVIEW OF SYSTEMS:     Musculoskeletal positive for right hand stiffness and pain  According to the patient 10 point review  All other systems are negative.    PFSH:  Worked as a  recently retired    TOBACCO USE:  Social History     Tobacco Use   Smoking Status Former Smoker     Packs/day: 0.25     Years: 40.00     Pack years: 10.00     Types: Cigarettes     Quit date: 5/10/2019     Years since quittin.5   Smokeless Tobacco Never Used   Tobacco Comment    1 pack per week       VITALS:  Vitals:    20 1120 20 1142   BP: 148/64 132/80   Patient Site:  Right Arm   Patient Position:  Sitting   Cuff Size:  Adult Regular   Pulse: 80    Resp: 18    Temp: 97.9  F (36.6  C)    TempSrc: Oral    Weight: 185 lb 9 oz (84.2 kg)    Height: 5' 5.98\" (1.676 m)      Wt Readings from Last 3 Encounters:   20 185 lb 9 oz (84.2 kg)   10/05/20 189 lb (85.7 kg)   01/10/20 180 lb 7 oz (81.8 kg)       PHYSICAL EXAM:  Interactive male sitting comfortably in exam room no acute distress  HEENT neck supple mucous members moist  Respiratory system clear to auscultation good breath sounds no wheeze no crackles 6 musculoskeletal system no tenderness elicited when I palpate the dorsum of his right hand or the right MCP and PIP joints.  No evidence of Anastasiya's nodes  CVS regular rate and rhythm no murmurs rubs gallops appreciated   DATA REVIEWED:  Additional History from Old Records Summarized (2): 2  Reviewed Dr. Clarke s note Venecia " given  Decision to Obtain Records (1): 0  Radiology Tests Summarized or Ordered (1): 0  Labs Reviewed or Ordered (1): 1  Medicine Test Summarized or Ordered (1): 0  Independent Review of EKG or X-RAY(2 each): 0    The visit lasted a total of 22 minutes face to face with the patient. Over 50% of the time was spent counseling and educating the patient about   Coronary artery disease a flu shot hand pain hyperlipidemia.    MEDICATIONS:  Current Outpatient Medications   Medication Sig Dispense Refill     aspirin 81 mg chewable tablet Chew 81 mg daily.       atorvastatin (LIPITOR) 40 MG tablet Take 1 tablet (40 mg total) by mouth daily. 90 tablet 0     meloxicam (MOBIC) 15 MG tablet Take 1 tablet (15 mg total) by mouth daily. 30 tablet 0     No current facility-administered medications for this visit.      Madhu ZALDIVAR

## 2021-06-13 NOTE — TELEPHONE ENCOUNTER
Wellness Screening Tool  Symptom Screening:  Do you have one of the following NEW symptoms:    Fever (subjective or >100.0)?  No    A new cough?  No    Shortness of breath?  No     Chills? No     New loss of taste or smell? No     Generalized body aches? No     New persistent headache? No     New sore throat? No     Nausea, vomiting, or diarrhea?  No    Within the past 2 weeks, have you been exposed to someone with a known positive illness below:    COVID-19 (known or suspected)?  No    Chicken pox?  No    Mealses?  No    Pertussis?  No    Patient notified of visitor policy- No visitors are allowed to accompany the patient at this time. Yes  Pt informed to wear a mask: Yes  Pt notified if they develop any symptoms listed above, prior to their appointment, they are to call the clinic directly at [phone] for further instructions.  Yes  Patient's appointment status: Patient will be seen in clinic as scheduled on 12/8

## 2021-06-13 NOTE — PATIENT INSTRUCTIONS - HE
Mr Dimitri Almaraz,  I enjoyed visiting with you again today.  I am glad to hear you are doing well.  Per our conversation work on a little bit of weight loss.  I will plan on seeing you 1 year or sooner if needed.  Chin Suarez

## 2021-06-13 NOTE — TELEPHONE ENCOUNTER
Called patient to discuss options.     Hand pain seemed to be improving, but naproxen and ice not working. He's hesitant about therapy. Would like to discuss injections.     Appointment made for 11/24 at 11:45 with PCP to evaluate.       Herve Feldman MD

## 2021-06-13 NOTE — TELEPHONE ENCOUNTER
"Please call patient /ok to leave a detailed message.    See Office Visit notes from 10/5/20.     Patient reporting ongoing right hand pain in \"2 middle fingers.\" Stating he initially thought it was from overuse. Patient reporting pain has improved slightly, swelling has gone down. Denies redness. Afebrile.    Reporting fingers remain painful with movement and sometimes at rest. Stating it has woken him from sleep. Patient stating he did not feel the Naproxen helped. Stating he has stopped since he ran out of medication. Reporting he is taking aspirin for pain now.     Per discharge instructions from 10/5/20 Advised to follow-up if symptoms are not improving as he may need hand therapy.    Please advised patient if referral would be placed or if he should be re seen first. Stating he does not feel therapy would help.    COVID 19 Nurse Triage Plan/Patient Instructions    Please be aware that novel coronavirus (COVID-19) may be circulating in the community. If you develop symptoms such as fever, cough, or SOB or if you have concerns about the presence of another infection including coronavirus (COVID-19), please contact your health care provider or visit [email or web address].     Disposition/Instructions    In-Person Visit with provider recommended. Reference Visit Selection Guide.    Thank you for taking steps to prevent the spread of this virus.  o Limit your contact with others.  o Wear a simple mask to cover your cough.  o Wash your hands well and often.    Resources    [facility]: About COVID-19: [email or web address]    CDC: What to Do If You're Sick: www.cdc.gov/coronavirus/2019-ncov/about/steps-when-sick.html    CDC: Ending Home Isolation: www.cdc.gov/coronavirus/2019-ncov/hcp/disposition-in-home-patients.html     CDC: Caring for Someone: www.cdc.gov/coronavirus/2019-ncov/if-you-are-sick/care-for-someone.html     [facility]: Interim Guidance for Hospital Discharge to Home: " www.health.[facility].mn.us/diseases/coronavirus/hcp/hospdischarge.pdf    [facility] clinical trials (COVID-19 research studies): [email or web address]/umn-[email or web address]-trials     Below are the COVID-19 hotlines at the [place] Department of Health ([facility]). Interpreters are available.   o For health questions: Call 928-914-1388 or 1-726.722.3864 (7 a.m. to 7 p.m.)  o For questions about schools and childcare: Call 797-987-9109 or 1-459.556.2780 (7 a.m. to 7 p.m.)               Reason for Disposition    MODERATE pain (e.g., interferes with normal activities) and present > 3 days    Additional Information    Negative: Similar pain previously and it was from 'heart attack'    Negative: Similar pain previously from 'angina' and not relieved by nitroglycerin    Negative: Sounds like a life-threatening emergency to the triager    Negative: Fever and red area (or area very tender to touch)    Negative: Fever and swollen joint    Negative: Patient sounds very sick or weak to the triager    Negative: SEVERE pain (e.g., excruciating, unable to use hand at all)    Negative: Red area or streak and large (> 2 in or 5 cm)    Negative: Weakness (i.e., loss of strength) of new onset in hand or fingers    Negative: Numbness (i.e., loss of sensation) of new onset in hand or fingers    Negative: Looks like a boil, infected sore, deep ulcer, or other infected rash (spreading redness, pus)    Negative: Localized rash is very painful (no fever)    Protocols used: HAND AND WRIST PAIN-A-OH

## 2021-06-14 NOTE — TELEPHONE ENCOUNTER
Refill Approved    Rx renewed per Medication Renewal Policy. Medication was last renewed on 9/21/2020.  Last office visit: 11/24/2020 with PCP Dr DION Todd, [facility] Triage/Med Refill 1/24/2021     Requested Prescriptions   Pending Prescriptions Disp Refills     atorvastatin (LIPITOR) 40 MG tablet [Pharmacy Med Name: Atorvastatin Calcium Oral Tablet 40 MG] 90 tablet 0     Sig: Take 1 tablet (40 mg total) by mouth daily.       Statins Refill Protocol (Hmg CoA Reductase Inhibitors) Passed - 1/23/2021  1:32 PM        Passed - PCP or prescribing provider visit in past 12 months      Last office visit with prescriber/PCP: 11/24/2020 Helio Nava MD OR same dept: 11/24/2020 Helio Nava MD OR same specialty: 11/24/2020 Helio Nava MD  Last physical: 6/25/2019 Last MTM visit: Visit date not found   Next visit within 3 mo: Visit date not found  Next physical within 3 mo: Visit date not found  Prescriber OR PCP: Helio Nava MD  Last diagnosis associated with med order: 1. Hyperlipemia  - atorvastatin (LIPITOR) 40 MG tablet [Pharmacy Med Name: Atorvastatin Calcium Oral Tablet 40 MG]; Take 1 tablet (40 mg total) by mouth daily.  Dispense: 90 tablet; Refill: 0    If protocol passes may refill for 12 months if within 3 months of last provider visit (or a total of 15 months).

## 2021-06-15 NOTE — PROGRESS NOTES
Dimitri Almaraz is a 65 y.o. male who is being evaluated via a billable telephone visit.      What phone number would you like to be contacted at? [phone]  How would you like to obtain your AVS? AVS Preference: [place].  Assessment & Plan     Pain of right hand  Today we conducted a virtual phone visit and he continues to have pain in his right hand especially after use.  I obtained a plain radiograph of his right hand today there is evidence of a fracture at the base of the distal phalanx of the index finger this appears to be an old fracture.  He also has severe degenerative arthritis at the first CMC joint and the third MCP joint I will have him see orthopedics because his pain is been ongoing for more than 4 months with no evidence of trauma blood pressure is improved with sodium restriction-come back in 3 months for his annual well visit.  He should continue taking with a lower pain.  - XR Hand Right 2 VWS       :    Helio Nava MD  [facility]     Dimitri Almaraz is 65 y.o. and presents to clinic today on a virtual phone visit for follow-up of his right hand pain.  I had seen him previously in November for this pain and gave him a prescription of meloxicam he reports that the medication did not help at all and he notes the pain is still present mainly when he uses right hand.  He has been able to work at home but needs to ball his right hand up at night with the blanket to relieve the discomfort.  He feels the joint pain that is deep in his first and second fingers but not present anywhere else in the right extremity there are no relieving factors use makes the discomfort worse.    Review of Systems  Musculoskeletal positive for right hand discomfort      Objective       Vitals:  No vitals were obtained today due to virtual visit.    Physical Exam  Not done as this was a virtual visit            Phone call duration: 30 minutes

## 2021-06-16 NOTE — PROGRESS NOTES
"Dimitri Almaraz is a 65 y.o. male who is being evaluated via a billable telephone visit.      What phone number would you like to be contacted at?   [phone]    How would you like to obtain your AVS? AVS Preference: Mail a copy.    1. Type 2 diabetes mellitus without complication, without long-term current use of insulin (H)  Glycosylated Hemoglobin A1c   Patient has been losing weight without exercising.  He is lost more than 20 pounds.  I will repeat an A1c and electrolytes today he denies any weakness.  Denies headaches and dizziness.  He has not been feeling sick or ill. Basic Metabolic Panel    CANCELED: Glycosylated Hemoglobin A1c    CANCELED: Lipid [place] RANDOM    CANCELED: Basic Metabolic Panel   2. Hyperlipemia  Lipid [place] FASTING       3 mood change  Patient noticed a change in mood he becomes easily irritable.  He is not more anxious she still sleeping well but his family and friends have noticed this.  They asked if he is worried and he says that he is not he is not suicidal.  He says he had not noted this until the last 1 month but his family says he has been more irritable for the last 4 months.  I will have him come into clinic if his test proved to be unremarkable  Subjective   Dimitri Almaraz is 65 y.o. and presents today for the following health issues weight loss and mood changes.  Patient notes that over the last 3 months he has lost weight without really trying he denies any weakness chest pain shortness of breath or fever but says is close to become looser.  He is not over exercising and has lost more than 20 pounds he says he thinks his blood sugars are well controlled.  He also states that his family and now his friends have noticed that he has had some severe mood changes he has become more irritable.  He also thinks that he is \"\" fuse is shorter.  On the weekend he again became mad at a friend because he was taking too many pictures to the motor vehicle/motorcycle ride.  He " denies having any problems with increased depression and thinks he may be anxious however he has not had any panic attacks nor is he felt that he is having difficulty with his life he is retired and has no major problems so to speak.  He states he thinks his appetite is normal he has not any problems with his memory.            Review of Systems  Psych positive for increased labile mood  Increased irritability  General positive for weight loss      Objective       Vitals:  No vitals were obtained today due to virtual visit.    Physical Exam  Not done as this was a virtual visit            Phone call duration: 20 minutes

## 2021-06-16 NOTE — TELEPHONE ENCOUNTER
Reason for Call:  Request for results:    Name of test or procedure: thyroid, CBC    Date of test of procedure: 4/14    Location of the test or procedure: [facility] to leave the result message on voice mail or with a family member? Yes    Phone number Patient can be reached at: [phone]    Additional comments: would like call today    Call taken on 4/16/2021 at 10:32 AM by Arina Elkins

## 2021-06-16 NOTE — PROGRESS NOTES
ASSESSMENT and plan   1. Weight loss  He is lost 20 pounds by watching his caloric intake he no longer eats refined sugar he stopped all sweets.  He has not been exercising regularly states that his energy levels are normal checking a thyroid test and hemogram today.  - HM1(CBC and Differential)  - Thyroid Stimulating Hormone (TSH)    2. Type 2 diabetes mellitus without complication, without long-term current use of insulin (H)    Diabetes is very well controlled A1c is now 6 I reduced his Metformin to 1000 mg/day he will take 500 mg twice daily we will recheck his A1c in 6 months it should be noted that he has not been very active intent to plan more activities including long distance biking in the summer  - metFORMIN (GLUCOPHAGE) 1000 MG tablet; Take 0.5 tablets (500 mg total) by mouth 2 (two) times a day with meals.  Dispense: 60 tablet; Refill: 11    There are no Patient Instructions on file for this visit.    Orders Placed This Encounter   Procedures     Thyroid Stimulating Hormone (TSH)     HM1 (CBC with Diff)     Medications Discontinued During This Encounter   Medication Reason     metFORMIN (GLUCOPHAGE) 1000 MG tablet        No follow-ups on file.    CHIEF COMPLAINT:  No chief complaint on file.      HISTORY OF PRESENT ILLNESS:  Dimitri is a 65 y.o. male who is in clinic today following up for lab test that he had drawn yesterday after a virtual visit.  He reports that he feels well and feels that his irritability has gone down just because he has more energy.  He thinks that because he is watching his diet and his activity level he was healthier than he has been in years.  Denies any fatigue headaches dizziness chest pain he sleeping well.  His libido is normal.  He was relieved to hear that all his lab tests including his A1c are indicating good control.    REVIEW OF SYSTEMS:   10 point review  All other systems are negative.    PFSH:    Social history reviewed    TOBACCO USE:  Social History     Tobacco  "Use   Smoking Status Former Smoker     Packs/day: 0.25     Years: 40.00     Pack years: 10.00     Types: Cigarettes     Quit date: 5/10/2019     Years since quittin.9   Smokeless Tobacco Never Used   Tobacco Comment    1 pack per week       VITALS:  Vitals:    21 1621   BP: 116/62   Patient Site: Left Arm   Patient Position: Sitting   Cuff Size: Adult Large   Pulse: 62   Temp: 97.9  F (36.6  C)   TempSrc: Oral   SpO2: 98%   Weight: 162 lb (73.5 kg)   Height: 5' 6\" (1.676 m)     Wt Readings from Last 3 Encounters:   21 162 lb (73.5 kg)   20 184 lb 8 oz (83.7 kg)   20 185 lb 9 oz (84.2 kg)       PHYSICAL EXAM:  Interactive male sitting comfortably in exam room no acute distress  HEENT neck supple mucous members moist, oral Shows no exit erythema thyroid is not enlarged or tender  Respiratory system clear to auscultation equal breath sounds no wheeze no crackles  CVS regular rate and rhythm no murmurs rubs gallops appreciated  Psych oriented x3 well-groomed not agitated affect is normal    DATA REVIEWED:  Additional History from Old Records Summarized (2): 0  Decision to Obtain Records (1): 0  Radiology Tests Summarized or Ordered (1): 0  Labs Reviewed or Ordered (1): 0  Medicine Test Summarized or Ordered (1): 0  Independent Review of EKG or X-RAY(2 each): 0    The visit lasted a total of 30 minutes     MEDICATIONS:  Current Outpatient Medications   Medication Sig Dispense Refill     aspirin 81 mg chewable tablet Chew 81 mg daily.       atorvastatin (LIPITOR) 40 MG tablet Take 1 tablet (40 mg total) by mouth daily. 90 tablet 3     meloxicam (MOBIC) 15 MG tablet Take 1 tablet (15 mg total) by mouth daily. 30 tablet 0     metFORMIN (GLUCOPHAGE) 1000 MG tablet Take 0.5 tablets (500 mg total) by mouth 2 (two) times a day with meals. 60 tablet 11     No current facility-administered medications for this visit.      Helio don md  "

## 2021-06-16 NOTE — TELEPHONE ENCOUNTER
Reason for Call:  Other liver/kidney function lab orders     Detailed comments: Pt states he spoke to Dr. Nava yesterday and thought he was going to order liver and kidney function. I see that BMP, Lipid, A1C was order. Can Dr. Nava enter this order in? He's coming for lab appt on 5/4.    Phone Number Patient can be reached at:   Cell number on file:    Telephone Information:   Mobile [phone]       Best Time: any    Can we leave a detailed message on this number?: Yes    Call taken on 4/9/2021 at 12:46 PM by Arina Elkins

## 2021-06-16 NOTE — PROGRESS NOTES
Dimitri Almaraz is a 65 y.o. male who is being evaluated via a billable telephone visit.      What phone number would you like to be contacted at? [phone]  How would you like to obtain your AVS?     Assessment & Plan     Type 2 diabetes mellitus without complication, without long-term current use of insulin (H)  Avoiding carbohydrate rich foods not checking blood sugar.  Will return within 1 month to have her A1c checked.  - metFORMIN (GLUCOPHAGE) 1000 MG tablet  Dispense: 60 tablet; Refill: 11  - Glycosylated Hemoglobin A1c  - Basic Metabolic Panel    Hyperlipemia    Taking atorvastatin regularly.  - atorvastatin (LIPITOR) 40 MG tablet  Dispense: 90 tablet; Refill: 3  - Lipid [place] FASTING               BMI:       No follow-ups on file.    Helio Nava MD  [facility]   Dimitri Almaraz is 65 y.o. and presents today for the following health issues hyperlipidemia and diabetes.  HPI     Patient reports that he is lost weight and has been watching his diet.  He reports that he thinks his blood sugars are really better he does not check his blood sugars.  He has said that he has no reason to check his blood sugars they have never done so in the past he states has been taking his cholesterol medicine and does not want a Covid 19 vaccination        Review of Systems    10 point review of systems negative      Objective       Vitals:  No vitals were obtained today due to virtual visit.    Physical Exam  Not done            Phone call duration: 20 minutes

## 2021-06-18 NOTE — PROGRESS NOTES
Subjective:   Saw comes in today for check of his blood pressure and blood sugars.  He has a history of coronary artery disease and mild hypertension.  He is on atorvastatin for cholesterol control.  His glycosylated hemoglobin has been mildly elevated.  He is controlling this with diet and exercise.  He is on no diabetic medications.  He continues to feel well.  He denies chest pains or breathing problems.  He denies polyuria or polydipsia.  He has no side effects to any of his medications.      Objective:  HEENT: Fundi appear benign.  Conjunctivae clear.  Pharynx is clear.  No exudates or erythema noted.  Neck: No thyromegaly present.  No increased JVD noted.  Cardiac: There is a regular rhythm present.  No ectopy or murmur heard.  Lungs: Lungs are clear.  Patient is in no respiratory distress.  Musculoskeletal: Pulses are strong in the feet.  Skin integrity normal.  Sensory exam of the toes is normal.  No edema present.        Assessment:  1.  Diabetes mellitus which is well controlled on diet and exercise therapy  2.  Strong family history of coronary artery disease  3.  Hyperlipidemia  4.  Vitamin D deficiency      Plan:  Patient will have routine lab work done today.  He will be called with any lab abnormalities.  He will continue his exercise program.  He will continue his well-balanced diet as well.  We discussed diabetic medication if and when his hemoglobin A1c increases.  Follow-up here within 6 months for recheck.  Prevnar will be updated today.

## 2021-06-19 NOTE — LETTER
Letter by Helio Nava MD at      Author: Helio Nava MD Service: -- Author Type: --    Filed:  Encounter Date: 7/11/2019 Status: (Other)         Regarding Dimitri Almaraz date of birth [birth year]        Please note,  Mr. Almaraz is a patient under my care.  He has type 2 diabetes.  He has been diagnosed with this for more than 3 years.  Current copy of all his lab tests will be provided along with this letter.  His most recent hemoglobin A1c test was 6.7.  If you have any questions or concerns regarding this patient please do not hesitate to contact me at my office.          Sincerely,    Madhu ZALDIVAR

## 2021-06-19 NOTE — LETTER
Letter by Helio Nava MD at      Author: Helio Nava MD Service: -- Author Type: --    Filed:  Encounter Date: 7/10/2019 Status: (Other)        [facility] PATIENT ACCESS  [address] [place] [address] [address]  [facility] [id number] [phone]         Dimitri Almaraz  [address]  [place] [address]        [birth year]    Dear Dimitri Almaraz,     At [facility] we care about your health and well-being. Your primary care provider is committed to ensuring you receive high quality care and has chosen a network of specialists to assist in providing that care. Recently Dr. Nava referred you to Ophthalmology for specialty care.        It is important to your overall health to follow through with the recommendation from your provider. Please call [facility] at [phone] at your earliest convenience for assistance in scheduling an appointment.  If you have already scheduled this appointment, please disregard this notice.  Thank you for choosing [facility] for your healthcare needs.       Sincerely,       [facility] Specialty Scheduling

## 2021-06-20 NOTE — PROGRESS NOTES
Subjective:   Saw comes in today for check of his heart disease.  He has been a little fatigued recently.  His mood is been down slightly as well.  He has been under more stress at work.  He occasionally will get chest pain.  He has a strong family history of coronary artery disease.  He works as a .  He does do a lot of strenuous work.  He denies specific shortness of breath.  He denies polyuria or polydipsia.  He denies black stools.  Bowels been regular.  Appetite is good.  There have been no fevers, sweats or chills of any type.      Objective:  HEENT: Pupils equally round and reactive to light.  Nasal mucosa minimally inflamed.  Pharynx is clear.  Neck: Neck reveals no thyromegaly or lymphadenopathy.  No increased JVD present.  Lungs: Lungs appear clear.  Patient is in no respiratory distress.  Cardiac: There is a regular rhythm present.  One early ectopic beat heard.  No murmur heard.  Abdomen: Abdomen appears soft and nontender.  Bowel sounds are active throughout.  Musculoskeletal: 1+ edema noted in the feet today.  Pulses were strong.  Neurologic: Affect is slightly flat.  Cranial nerves all are intact.  Motor exam normal.  Sensory exam normal today.      Assessment:  1.  Coronary artery disease which appears stable  2.  Mood disorder consistent with mild depression  3.  Fatigue.  Probably multifactorial.      Plan:  I encouraged patient to be off of work this next 2 weeks in order to workup present symptoms.  Symptoms may be due to depression but must rule out coronary artery disease as a cause.  He will see cardiology for evaluation.  We talked about antidepressant therapy.  He would like to hold off on that at this point in time.  Stress from work may be his biggest stressor and most likely cause of his present mood disorder.  Follow-up here in 2 weeks for recheck.  I encouraged him to become more aerobically active and start a walking program.  He likes to bicycle as well.  If he develops any  chest pressure or chest pain while doing those he will cease those exercises.  No strenuous activity is recommended such as weightlifting.

## 2021-06-24 NOTE — TELEPHONE ENCOUNTER
The patient is listing Dr. Robles as a primary care provider. Dr. Robles retired from clinic practice December 2018. The patient is due to establish care with a new provider. The writer intends to contact the patient to assist with the scheduling process. If the patient has established care elsewhere, please discontinue Margaret as the primary physician and close.  The patient would like to establish care with Dr. Nava. Patient scheduled 04/19/19 at 11:20 AM.

## 2021-06-27 NOTE — PROGRESS NOTES
Progress Notes by Helio Nava MD at 6/25/2019  8:00 AM     Author: Helio Nava MD Service: -- Author Type: Physician    Filed: 6/25/2019  9:35 AM Encounter Date: 6/25/2019 Status: Signed    : Helio Nava MD (Physician)       MALE PREVENTATIVE EXAM    Assessment and Plan:       1. Hypercholesteremia    - Basic Metabolic Panel  - Lipid Profile    2. Type 2 diabetes mellitus without complication, without long-term current use of insulin (H)    Current A1c is 6.7.  He will check his sugars periodically with his brothers meter he does not want to start medication he will modify his diet continue to exercise regularly.  Foot exam was normal  - Glycosylated Hemoglobin A1c  - Microalbumin, Random Urine  - Ambulatory referral to Ophthalmology    3. Encounter to establish care    Reviewed prior notes.  He is now retired.  - PSA, Annual Screen (Prostatic-Specific Antigen)    4. Coronary atherosclerosis due to lipid rich plaque    No chest pain noted he does not have hypertension this will be removed from his list.    5. Immunization due      6.  Annual physical examination    Next follow up:  No follow-ups on file.    Immunization Review  Adult Imm Review: No immunizations due today  Documented tobacco use.  Website and phone contact for [employer]uit[employer] given to patient in AVS.    I discussed the following with the patient:   Adult Healthy Living: Importance of regular exercise  Healthy nutrition  Getting adequate sleep  Stress management  Use of seat belts    Advanced Directives not discussed today.    Subjective:   Chief Complaint: Dimitri Almaraz is an 64 y.o. male with coronary artery disease, diabetes mellitus type 2, history of hypertension, hyperlipidemia, WYATT, diverticulosis, osteoarthritis, and benign colonic polyps, here to establish care and for a preventative health visit.     HPI:  He last saw his cardiologist, Dr. Angie Suarez, in August 2017. At that time, no changes were made since his disease was  minimal and lipids were stable. It appears that Dr. Suarez ordered coronary CTA to be done back in December, but this has not been completed yet. His last one in 2013 showed normal left main and insignificant left into descending disease with normal circumflex right coronary artery. His blood pressure has been managed without medications, and is well-controlled today. No recent chest pain/discomfort/heaviness or shortness of breath.    Dr. Suarez was also monitoring his type 2 diabetes. His hemoglobin A1c today remains stable at 6.7. The patient manages his diabetes with diet. He does not check his blood sugars though is interested in learning how to do so.     Dimitri wears reading glasses which are working well for him. His last eye exam was a few months back without any prescription change.     He experiences intermittent heartburn. It can be triggered by beer. The patient is able to eat onions, garlic, and pizza fine. He did have some abdominal cramping last night and early this morning, and questions whether this may have been due to a sandwich he ate last night.    Dimitri denies any history of allergies. He does not snore at night. The patient sleeps very well, and gets 8-10 hours per night.    He has a skin lesion on his right chest area that would he would like evaluated.    Healthy Habits  Are you taking a daily aspirin? Yes  Do you typically exercising at least 40 min, 3-4 times per week?  Yes  Do you usually eat at least 4 servings of fruit and vegetables a day, include whole grains and fiber and avoid regularly eating high fat foods? Yes  Have you had an eye exam in the past two years? Yes  Do you see a dentist twice per year? Yes  Do you have any concerns regarding sleep? No    Safety Screen  If you own firearms, are they secured in a locked gun cabinet or with trigger locks? Unknown  Do you feel you are safe where you are living?: Yes (6/25/2019  8:13 AM)  Do you feel you are safe in your  relationship(s)?: Yes (6/25/2019  8:13 AM)      Review of Systems:    General: Negative for fatigue, fever, chills, poor appetite, or weight loss/gain.  Eyes: Negative for loss of vision or visual changes.  ENT: Negative for ear pain, nasal congestion, nasal drainage, sinus pain, sinus pressure, or sore throat.  Respiratory: Negative for shortness of breath, cough, or wheezing.  CV: Negative for chest pain.  GI: Low abdominal cramping. Heartburn. Negative for nausea, emesis, diarrhea, constipation, melena, or hematochezia.  : Negative for dysuria, hematuria, or urinary frequency.  Musculoskeletal: Negative for neck, back, or other joint or muscle pain.  Neuro: Negative for headache, numbness, or tingling.   All other systems are negative.    Cancer Screening       Status Date      COLONOSCOPY Next Due 6/20/2024      Done 6/20/2014 ENDOSCOPY, COLON          Patient Care Team:  Helio Nava MD as PCP - General (Family Medicine)    No past medical history on file.     Past Surgical History:   Procedure Laterality Date   ? AR REMOVE TONSILS/ADENOIDS,<13 Y/O      Description: Tonsillectomy With Adenoidectomy;  Recorded: 01/29/2009;   ? AR REPAIR OF NASAL SEPTUM      Description: Septoplasty;  Recorded: 01/29/2009;      Social History     Socioeconomic History   ? Marital status:      Spouse name: None   ? Number of children: None   ? Years of education: None   ? Highest education level: None   Occupational History   ? Occupation:      Employer: [employer]     Employer: RETIRED   Social Needs   ? Financial resource strain: None   ? Food insecurity:     Worry: None     Inability: None   ? Transportation needs:     Medical: None     Non-medical: None   Tobacco Use   ? Smoking status: Light Tobacco Smoker     Packs/day: 0.25     Years: 40.00     Pack years: 10.00     Types: Cigarettes   ? Smokeless tobacco: Never Used   ? Tobacco comment: 1 pack per week   Substance and Sexual Activity   ? Alcohol  "use: Yes     Alcohol/week: 0.0 oz     Comment: \"some...... weekends\"   ? Drug use: No   ? Sexual activity: Yes     Partners: Female   Lifestyle   ? Physical activity:     Days per week: None     Minutes per session: None   ? Stress: None   Relationships   ? Social connections:     Talks on phone: None     Gets together: None     Attends [employer] service: None     Active member of club or organization: None     Attends meetings of clubs or organizations: None     Relationship status: None   ? Intimate partner violence:     Fear of current or ex partner: None     Emotionally abused: None     Physically abused: None     Forced sexual activity: None   Other Topics Concern   ? None   Social History Narrative    He retired in June 2019. He is very active, and likes to go biking for exercise.      Family History   Problem Relation Age of Onset   ? Heart disease Mother    ? Heart disease Father    ? Heart disease Sister    ? Heart disease Brother    ? Heart disease Sister    ? Diabetes Sister    ? Diabetes Brother    ? Diabetes Brother    ? Diabetes Brother    ? Heart disease Brother    ? Diabetes Brother    ? Diabetes Brother    ? Diabetes Brother         History     Reviewed By Date/Time Sections Reviewed    Liana Alba MA 6/25/2019  8:14 AM Tobacco            Objective:   Vital Signs:   Visit Vitals  /72   Pulse 73   Temp 98.1  F (36.7  C) (Oral)   Resp 18   Ht 5' 6\" (1.676 m)   Wt 176 lb 2 oz (79.9 kg)   SpO2 96%   BMI 28.43 kg/m           PHYSICAL EXAM:  General: Alert, cooperative, no distress, appears stated age  Head: Normocephalic, without obvious abnormality, atraumatic  Eyes: PERRL, conjunctiva/cornea clear, EOM's intact, fundi benign, both eyes  Ears: Normal TM's and external ear canals, both ears  Nose: Nares normal, septum midline, right inferior nasal turbinate hypertrophy,  mucosa normal, no drainage or sinus tenderness  Throat: Lips, mucosa, and tongue normal; teeth and gums normal  Back: " Symmetric, no curvature, ROM normal, no CVA tenderness  Lungs: Clear to auscultation bilaterally, respirations unlabored  Chest wall: No tenderness or deformity  Heart: Regular rate and rhythm, S1 and S2 normal, no murmur, rub, or gallop  Abdomen: Soft, non tender, bowel sounds active all four quadrants, no masses, no organomegaly.  : No penile lesions or discharge, no testicular masses or tenderness, no hernias  Rectum: Slightly enlarged prostate, smooth, nontender, symmetric, no masses or nodules   Extremities: Sensory exam of the foot is normal, tested with the monofilament. Good pulses, no lesions or ulcers, good peripheral pulses.   Neurologic:  A & O x 3.  No tremor, no focal findings.   DTRs are normal and symmetric both proximally and distally BUE and BLE.  Strength testing is normal and symetric both proximally and distally BUE and BLE.  Normal gait.   Skin: A ring of four seborrheic keratoses noted on right anterior chest wall    The 10-year ASCVD risk score (Juliaelida CRUZ Jr., et al., 2013) is: 29.4%    Values used to calculate the score:      Age: 64 years      Sex: Male      Is Non- : No      Diabetic: Yes      Tobacco smoker: Yes      Systolic Blood Pressure: 118 mmHg      Is BP treated: No      HDL Cholesterol: 30 mg/dL      Total Cholesterol: 136 mg/dL         Medication List           Accurate as of 6/25/19  9:13 AM. If you have any questions, ask your nurse or doctor.               CONTINUE taking these medications    aspirin 81 mg chewable tablet  INSTRUCTIONS:  Chew 81 mg daily.        atorvastatin 40 MG tablet  Also known as:  LIPITOR  INSTRUCTIONS:  Take 1 tablet (40 mg total) by mouth daily.        benzonatate 100 MG capsule  Also known as:  TESSALON PERLES  INSTRUCTIONS:  Take 1 capsule (100 mg total) by mouth 3 (three) times a day as needed for cough.        dextromethorphan 30 mg/5 mL liquid  Also known as:  DELSYM  INSTRUCTIONS:  Take 10 mL (60 mg total) by mouth 2  (two) times a day.        fluticasone propionate 50 mcg/actuation nasal spray  Also known as:  FLONASE  INSTRUCTIONS:  1 spray into each nostril 2 times a day at 6:00 am and 4:00 pm.        loratadine 10 mg tablet  Also known as:  CLARITIN  INSTRUCTIONS:  Take 1 tablet (10 mg total) by mouth daily.        sodium chloride 0.65 % Drop  INSTRUCTIONS:  Use 1 spray in each nostril q2h prn for nasal congestion        VITAMIN D3 1,000 unit capsule  INSTRUCTIONS:  Take 1,000 Units by mouth daily.  Generic drug:  cholecalciferol (vitamin D3)               Additional Screenings Completed Today:   Hemoglobin A1c, BMP, lipid cascade, urine microalbumin, and PSA ordered.      DATA REVIEWED:  Additional History from Old Records Summarized (2): Reviewed Dr. Suarez's 08/21/2017 note regarding follow up on coronary artery disease, diabetes mellitus type 2, hyperlipidemia, and hypertension.  Decision to Obtain Records (1): none  Radiology Tests Summarized or Ordered (1): CTA coronary 03/07/2013 showed normal left main and insignificant left into descending disease with normal circumflex right coronary artery.  Labs Reviewed or Ordered (1): Hemoglobin A1c today is 6.7. BMP, lipid cascade, urine microalbumin, and PSA ordered.  Medicine Test Summarized or Ordered (1): none  Independent Review of EKG or X-RAY(2 each): none      I, Dana Solis, am scribing for and in the presence of, Dr. Nava.    IDr. Nava, personally performed the services described in this documentation, as scribed by Dana Solis in my presence, and it is both accurate and complete.     Please note that this clinical encounter uses voice recognition software, there may be typographical errors present.

## 2021-06-30 NOTE — PROGRESS NOTES
Progress Notes by Angie Suarez MD at 12/8/2020  2:30 PM     Author: Angie Suarez MD Service: -- Author Type: Physician    Filed: 12/8/2020  2:59 PM Encounter Date: 12/8/2020 Status: Signed    : Angie Suarez MD (Physician)           [facility] Follow-up Note    Assessment & Plan        1. Coronary atherosclerosis due to lipid rich plaque -patient has minimal coronary artery disease.  He underwent invasive angiogram August 2011 that showed only mild disease in the left anterior descending midportion.  He underwent repeat CT angiogram in 2013 which showed normal left main and insignificant left anterior descending disease.  The circumflex and right coronary artery were normal.  He had stress nuclear in 2019 which was normal as well.  We'll continue to work on prevention given strong family history plan on repeat stress test and may be a year.   2. Hypercholesteremia -cholesterol 126 with an LDL of 64 which is excellent.  Continue current medication.  Does have an element of metabolic syndrome.   3. Hyperglycemia -now on Metformin secondary to increase hemoglobin A1c.   4. Obstructive Sleep Apnea -so noted and no CPAP.   5. Osteoarthritis -on meloxicam doing better and this will be discontinued.     Plan  1.  Work on weight loss.  2.  Follow-up me in 1 year and at that point time consider repeating stress test.  Follow-up me sooner if needed.    Subjective  CC: 65-year-old white gentleman being seen in yearly follow-up, since I seen him he completely retired but now has been working contract basis.  Physically active over the summer going for hikes and bike rides for maybe 20 miles. Patient complains of no syncope, dizziness, fatigue, fevers, chest pain, palpitations, shortness of breath, PND, orthopnea, nausea, vomiting, or edema.    Medications  Current Outpatient Medications   Medication Sig   ? aspirin 81 mg chewable tablet Chew 81 mg daily.   ? atorvastatin  "(LIPITOR) 40 MG tablet Take 1 tablet (40 mg total) by mouth daily.   ? meloxicam (MOBIC) 15 MG tablet Take 1 tablet (15 mg total) by mouth daily.   ? metFORMIN (GLUCOPHAGE) 1000 MG tablet Take 1 tablet (1,000 mg total) by mouth 2 (two) times a day with meals.       Objective  /70 (Patient Site: Left Arm, Patient Position: Sitting, Cuff Size: Adult Regular)   Pulse 64   Resp 20   Ht 5' 5.98\" (1.676 m)   Wt 184 lb 8 oz (83.7 kg)   BMI 29.79 kg/m      General Appearance:    Alert, cooperative, no distress, appears stated age   Head:    Normocephalic, without obvious abnormality, atraumatic   Throat:   Lips, mucosa, and tongue normal; teeth and gums normal   Neck:   Supple, symmetrical, trachea midline, no adenopathy;        thyroid:  No enlargement/tenderness/nodules; no carotid    bruit or JVD   Back:     Symmetric, no curvature, ROM normal, no CVA tenderness   Lungs:     Clear to auscultation bilaterally, respirations unlabored   Chest wall:    No tenderness or deformity   Heart:    Regular rate and rhythm, S1 and S2 normal, no murmur, rub   or gallop   Abdomen:     Soft, non-tender, bowel sounds active all four quadrants,     no masses, no organomegaly   Extremities:   Normal, atraumatic, no cyanosis or edema   Pulses:   2+ and symmetric all extremities   Skin:   Skin color, texture, turgor normal, no rashes or lesions     Results    Lab Results personally reviewed   Lab Results   Component Value Date    CHOL 126 11/25/2020    CHOL 140 06/25/2019     Lab Results   Component Value Date    HDL 30 (L) 11/25/2020    HDL 35 (L) 06/25/2019     Lab Results   Component Value Date    LDLCALC 64 11/25/2020    LDLCALC 85 06/25/2019     Lab Results   Component Value Date    TRIG 160 (H) 11/25/2020    TRIG 102 06/25/2019     Lab Results   Component Value Date    WBC 7.2 07/28/2016    HGB 15.9 07/28/2016    HCT 46.5 07/28/2016     07/28/2016     Lab Results   Component Value Date    CREATININE 1.12 11/25/2020    " BUN 17 11/25/2020     11/25/2020    K 4.1 11/25/2020    CO2 21 (L) 11/25/2020     Review of Systems:   General: Weight Gain  Eyes: WNL  Ears/Nose/Throat: WNL  Lungs: WNL  Heart: WNL  Stomach: Heartburn  Bladder: WNL  Muscle/Joints: Joint Pain  Skin: WNL  Nervous System: WNL  Mental Health: WNL     Blood: WNL

## 2021-07-03 NOTE — ADDENDUM NOTE
Addendum Note by Pennie Solano RT (R) at 2/1/2017 11:26 AM     Author: Pennie Solano RT (R) Service: -- Author Type: Radiologic Technologist    Filed: 2/1/2017 11:26 AM Encounter Date: 2/1/2017 Status: Signed    : Pennie Solano RT (R) (Radiologic Technologist)    Addended by: PENNIE SOLANO on: 2/1/2017 11:26 AM        Modules accepted: Orders

## 2021-09-20 ENCOUNTER — TELEPHONE (OUTPATIENT)
Dept: FAMILY MEDICINE | Facility: CLINIC | Age: 66
End: 2021-09-20

## 2021-09-20 DIAGNOSIS — E11.9 TYPE 2 DIABETES MELLITUS WITHOUT COMPLICATION, WITHOUT LONG-TERM CURRENT USE OF INSULIN (H): Primary | ICD-10-CM

## 2021-09-20 NOTE — TELEPHONE ENCOUNTER
Reason for Call: Request for an order or referral: Order    Order or referral being requested: BMP, A1C, TSH, Lipid Profile, CBC    Date needed: before my next appointment    Has the patient been seen by the PCP for this problem? YES    Additional comments: Pt requesting lab work to be done prior to visit with PCP. Requesting to repeat labs done in April. Lab appt is scheduled 10/1 and f/u with PCP on 10/7. Please order neccessary lab work to be done on 10/1 per pt request. Thank you.    Phone number Patient can be reached at:  Cell number on file:    Telephone Information:   Mobile [phone]       Best Time:  anytime    Can we leave a detailed message on this number?  Not Applicable    Call taken on 9/20/2021 at 8:56 AM by Rocio Romero

## 2021-10-01 ENCOUNTER — LAB (OUTPATIENT)
Dept: LAB | Facility: CLINIC | Age: 66
End: 2021-10-01
Payer: COMMERCIAL

## 2021-10-01 DIAGNOSIS — E11.9 TYPE 2 DIABETES MELLITUS WITHOUT COMPLICATION, WITHOUT LONG-TERM CURRENT USE OF INSULIN (H): ICD-10-CM

## 2021-10-01 DIAGNOSIS — E78.00 HYPERCHOLESTEREMIA: ICD-10-CM

## 2021-10-01 LAB
ALBUMIN SERPL-MCNC: 3.8 G/DL (ref 3.5–5)
ALP SERPL-CCNC: 73 U/L (ref 45–120)
ALT SERPL W P-5'-P-CCNC: 18 U/L (ref 0–45)
AST SERPL W P-5'-P-CCNC: 15 U/L (ref 0–40)
BILIRUB DIRECT SERPL-MCNC: 0.3 MG/DL
BILIRUB SERPL-MCNC: 0.7 MG/DL (ref 0–1)
CREAT UR-MCNC: 171 MG/DL
HBA1C MFR BLD: 5.8 % (ref 0–5.6)
MICROALBUMIN UR-MCNC: 0.66 MG/DL (ref 0–1.99)
MICROALBUMIN/CREAT UR: 3.9 MG/G CR
PROT SERPL-MCNC: 6.8 G/DL (ref 6–8)

## 2021-10-01 PROCEDURE — 82043 UR ALBUMIN QUANTITATIVE: CPT | Performed by: FAMILY MEDICINE

## 2021-10-01 PROCEDURE — 83036 HEMOGLOBIN GLYCOSYLATED A1C: CPT | Performed by: FAMILY MEDICINE

## 2021-10-01 PROCEDURE — 36415 COLL VENOUS BLD VENIPUNCTURE: CPT | Performed by: FAMILY MEDICINE

## 2021-10-01 PROCEDURE — 80076 HEPATIC FUNCTION PANEL: CPT | Performed by: FAMILY MEDICINE

## 2021-10-01 NOTE — RESULT ENCOUNTER NOTE
These inform patient that his A1c has improved it is now 5.8.  He should continue the good work we can recheck it again in 6 months if needed

## 2021-10-07 ENCOUNTER — OFFICE VISIT (OUTPATIENT)
Dept: FAMILY MEDICINE | Facility: CLINIC | Age: 66
End: 2021-10-07
Payer: COMMERCIAL

## 2021-10-07 VITALS
HEART RATE: 62 BPM | SYSTOLIC BLOOD PRESSURE: 124 MMHG | TEMPERATURE: 97.9 F | OXYGEN SATURATION: 98 % | HEIGHT: 66 IN | DIASTOLIC BLOOD PRESSURE: 66 MMHG | WEIGHT: 153.44 LBS | BODY MASS INDEX: 24.66 KG/M2 | RESPIRATION RATE: 18 BRPM

## 2021-10-07 DIAGNOSIS — E11.9 TYPE 2 DIABETES MELLITUS WITHOUT COMPLICATION, WITHOUT LONG-TERM CURRENT USE OF INSULIN (H): Primary | ICD-10-CM

## 2021-10-07 PROCEDURE — 99213 OFFICE O/P EST LOW 20 MIN: CPT | Performed by: FAMILY MEDICINE

## 2021-10-07 ASSESSMENT — MIFFLIN-ST. JEOR: SCORE: 1408.74

## 2021-10-07 NOTE — PROGRESS NOTES
"ASSESSMENT and plan   1. Type 2 diabetes mellitus without complication, without long-term current use of insulin (H)  Patient's A1c is now below 6 at 5.8 he continues to take Metformin 1000 mg/day is interested in obtaining a glucose meter to check his daily blood sugars.  I will refer him to our MTM pharmacist to see if he qualifies for CGM.  He is very active he is wondering whether he can have refined sugar in the form of a donut occasionally.  I informed him about measuring carbohydrate counts and exercise.  He lost approximately 30 pounds in the last 12 months by exercising and watching his diet  There are no Patient Instructions on file for this visit.    Orders Placed This Encounter   Procedures     Med Therapy Management Referral     There are no discontinued medications.    Follow-up in 6 months recommended    CHIEF COMPLAINT:  chief complaint    HISTORY OF PRESENT ILLNESS:  Dimitri is a 66 year old male who is here to follow-up for his diabetes currently taking Metformin he has been very active and feeling well his last A1c was 6 and he is returning today to have reevaluated reports that he would like to continue taking Metformin if needed.  He is also interested in glucose monitoring he is not interested in a flu shot today.    REVIEW OF SYSTEMS:     Patient 10 point review  All other systems are negative.    PFSH:    Social history reviewed    TOBACCO USE:  History   Smoking Status     Former Smoker     Packs/day: 0.25     Years: 40.00     Types: Cigarettes     Quit date: 5/10/2019   Smokeless Tobacco     Never Used     Comment: 1 pack per week       VITALS:  Vitals:    10/07/21 1446   BP: 124/66   Pulse: 62   Resp: 18   Temp: 97.9  F (36.6  C)   TempSrc: Oral   SpO2: 98%   Weight: 69.6 kg (153 lb 7 oz)   Height: 1.676 m (5' 6\")     Wt Readings from Last 3 Encounters:   10/07/21 69.6 kg (153 lb 7 oz)   04/14/21 73.5 kg (162 lb)   12/08/20 83.7 kg (184 lb 8 oz)       PHYSICAL EXAM:    Interactive male " sitting [facility] exam room no acute distress  Neuro cranial nerves II to XII intact gait normal reflexes are brisk motor tone is normal    DATA REVIEWED:  Additional History from Old Records Summarized (2): 0  Decision to Obtain Records (1): 0  Radiology Tests Summarized or Ordered (1): 0  Labs Reviewed or Ordered (1): 0  Medicine Test Summarized or Ordered (1): 0  Independent Review of EKG or X-RAY(2 each): 0    The visit lasted a total of 20 minutes .    MEDICATIONS:  Current Outpatient Medications   Medication Sig Dispense Refill     aspirin 81 mg chewable tablet [ASPIRIN 81 MG CHEWABLE TABLET] Chew 81 mg daily.       atorvastatin (LIPITOR) 40 MG tablet [ATORVASTATIN (LIPITOR) 40 MG TABLET] Take 1 tablet (40 mg total) by mouth daily. 90 tablet 3     meloxicam (MOBIC) 15 MG tablet [MELOXICAM (MOBIC) 15 MG TABLET] Take 1 tablet (15 mg total) by mouth daily. 30 tablet 0     metFORMIN (GLUCOPHAGE) 500 MG tablet [METFORMIN (GLUCOPHAGE) 500 MG TABLET] Take 1 tablet (500 mg total) by mouth 2 (two) times a day with meals. 60 tablet 6

## 2021-10-22 ENCOUNTER — OFFICE VISIT (OUTPATIENT)
Dept: PHARMACY | Facility: CLINIC | Age: 66
End: 2021-10-22
Payer: COMMERCIAL

## 2021-10-22 VITALS
DIASTOLIC BLOOD PRESSURE: 62 MMHG | HEART RATE: 62 BPM | BODY MASS INDEX: 25.54 KG/M2 | WEIGHT: 158.25 LBS | SYSTOLIC BLOOD PRESSURE: 130 MMHG | OXYGEN SATURATION: 98 %

## 2021-10-22 DIAGNOSIS — E11.9 TYPE 2 DIABETES MELLITUS WITHOUT COMPLICATION, WITHOUT LONG-TERM CURRENT USE OF INSULIN (H): Primary | ICD-10-CM

## 2021-10-22 PROCEDURE — 99607 MTMS BY PHARM ADDL 15 MIN: CPT | Performed by: PHARMACIST

## 2021-10-22 PROCEDURE — 99605 MTMS BY PHARM NP 15 MIN: CPT | Performed by: PHARMACIST

## 2021-10-22 RX ORDER — BLOOD SUGAR DIAGNOSTIC
STRIP MISCELLANEOUS
Qty: 100 STRIP | Refills: 3 | Status: SHIPPED | OUTPATIENT
Start: 2021-10-22

## 2021-10-22 RX ORDER — LANCETS
1 EACH MISCELLANEOUS DAILY
Qty: 100 EACH | Refills: 3 | Status: SHIPPED | OUTPATIENT
Start: 2021-10-22 | End: 2023-10-06

## 2021-10-22 NOTE — PROGRESS NOTES
Medication Therapy Management (MTM) Encounter    ASSESSMENT:                            Medication Adherence/Access: No issues identified    1. Type 2 diabetes mellitus without complication, without long-term current use of insulin (H)  A1c very well controlled to goal of less than 7%.  MTM pharmacist encouraged patient's well controlled A1c and diabetes with strict diet and exercise.  Patient appropriately taking Metformin as prescribed and moderate intensity statin.  Recommend continuation without change.  MTM reviewed options of CGM versus traditional glucometer today, and the end with patient's well controlled A1c, decided that CGM is not necessary at this time.  Alternatively, MTM pharmacist provided glucometer teaching and provided patient with a Accu-Chek guide me meter and prescription for testing supplies to monitor blood sugars as desired, recommended once weekly to once monthly monitoring.  Discussed goal blood sugars of fasting , 2 hours postprandial less than 180.  Of note, patient does qualify for aspirin for primary prevention based on USPSTF recommendations, though recommend close monitoring of bleeding/bruising and discontinuation if increased risk of bleeds.    PLAN:                            1.  MTM pharmacist provided glucometer teaching and new glucometer to patient today  2.  Sent prescriptions for to strips and lancets for blood sugar monitoring    Follow-up: Return in about 6 months (around 4/22/2022) for PCP. Recommend MTM PRN.    SUBJECTIVE/OBJECTIVE:                          Dimitri Almaraz is a 66 year old male coming in for a initial visit. He was referred to me from Helio Nava MD.      Reason for visit: MTM initial visit; referral from PCP for diabetes.     Allergies/ADRs: Reviewed in chart  Past Medical History: DM runs very commonly in his family.   Tobacco: He reports that he quit smoking about 2 years ago. His smoking use included cigarettes. He has a 10.00 pack-year  smoking history. He has never used smokeless tobacco.  Alcohol: none    Medication Adherence/Access: no issues reported    Type 2 Diabetes:  Currently taking metformin 500 mg twice daily. Patient is not experiencing side effects.  Blood sugar monitoring: never.  Patient has never checked his blood sugars before.  [facility] pharmacist provided Accu-Chek guide me glucometer today and education.  Patient's blood sugar 2 hours postprandial was 81 in clinic today.  Symptoms of low blood sugar? Concerned that he may have been low at some times.  Symptoms of high blood sugar? none  Eye exam: due, states that he has not had an eye exam within the last year.  Foot exam: due, no issues reported  Diet/Exercise: Quit all the sugar and milk, no alcohol or tobacco. Does not want to lose any more weight. Usually eating 2 meals per day. Hard to stay away from pasta and carbohydrates.   Aspirin: Taking 81mg daily (though states that he does not take it every day, because he forgets)- did have a bruise that lasted for 3 weeks recently. Denies any other symptoms of bleeding such as bleeding gums or dark stools.   Statin: Yes: atorvastatin 40 mg daily   ACEi/ARB: None, not indicated  Hemoglobin A1C   Date Value Ref Range Status   10/01/2021 5.8 (H) 0.0 - 5.6 % Final     Comment:     Normal <5.7%   Prediabetes 5.7-6.4%    Diabetes 6.5% or higher     Note: Adopted from ADA consensus guidelines.   04/13/2021 6.0 (H) <=5.6 % Final   11/25/2020 8.0 (H) <=5.6 % Final     Comment:     Normal <5.7% Prediabete 5.7-6.4% Diabletes 6.5% or higher - adopted from ADA consensus guidelines      Microalbumin Urine mg/dL   Date Value Ref Range Status   10/01/2021 0.66 0.00 - 1.99 mg/dL Final      Creatinine Urine mg/dL   Date Value Ref Range Status   10/01/2021 171 mg/dL Final     LDL Cholesterol Calculated   Date Value Ref Range Status   04/13/2021 67 <=129 mg/dL Final     Creatinine   Date Value Ref Range Status   04/13/2021 1.04 0.70 - 1.30 mg/dL Final      The ASCVD Risk score ([place] NANCY Jr., et al., 2013) failed to calculate for the following reasons:    The valid total cholesterol range is 130 to 320 mg/dL    BP Readings from Last 3 Encounters:   10/22/21 130/62   10/07/21 124/66   04/14/21 116/62      Pulse Readings from Last 3 Encounters:   10/22/21 62   10/07/21 62   04/14/21 62     Wt Readings from Last 3 Encounters:   10/22/21 158 lb 4 oz (71.8 kg)   10/07/21 153 lb 7 oz (69.6 kg)   04/14/21 162 lb (73.5 kg)       Ref. Range 10/1/2021 08:35   Microalbumin Urine mg/dL Latest Ref Range: 0.00 - 1.99 mg/dL 0.66     Today's Vitals: /62   Pulse 62   Wt 158 lb 4 oz (71.8 kg)   SpO2 98%   BMI 25.54 kg/m    ----------------  I spent 45 minutes with this patient today. All changes were made via collaborative practice agreement with Helio Nava MD. A copy of the visit note was provided to the patient's primary care provider.    The patient declined a summary of these recommendations.     Pooja Boyce, PharmD, BCACP  Medication Therapy Management Pharmacist     Medication Therapy Recommendations  Type 2 diabetes mellitus without complication (H)    Current Medication: metFORMIN (GLUCOPHAGE) 500 MG tablet   Rationale: Medication requires monitoring - Needs additional monitoring - Effectiveness   Recommendation: Self-Monitoring - teach meter and sent rx for supplies   Status: Accepted per CPA

## 2021-10-22 NOTE — LETTER
"        Date: 10/22/2021    Dimitri Almaraz  [address]  [place] [address]    Dear Mr. Almaraz,    Thank you for talking with me on 10/22/21 about your health and medications. Medicare s MTM (Medication Therapy Management) program helps you understand your medications and use them safely.      This letter includes an action plan (Medication Action Plan) and medication list (Personal Medication List). The action plan has steps you should take to help you get the best results from your medications. The medication list will help you keep track of your medications and how to use them the right way.       Have your action plan and medication list with you when you talk with your doctors, pharmacists, and other healthcare providers in your care team.     Ask your doctors, pharmacists, and other healthcare providers to update the action plan and medication list at every visit.     Take your medication list with you if you go to the hospital or emergency room.     Give a copy of the action plan and medication list to your family or caregivers.     If you want to talk about this letter or any of the papers with it, please call   [phone].We look forward to working with you, your doctors, and other healthcare providers to help you stay healthy through the [facility] MTM program.    Sincerely,  Pooja Boyce, PharmD    Enclosed: Medication Action Plan and Personal Medication List    MEDICATION ACTION PLAN FOR Dimitri Almaraz,  1955     This action plan will help you get the best results from your medications if you:   1. Read \"What we talked about.\"   2. Take the steps listed in the \"What I need to do\" boxes.   3. Fill in \"What I did and when I did it.\"   4. Fill in \"My follow-up plan\" and \"Questions I want to ask.\"     Have this action plan with you when you talk with your doctors, pharmacists, and other healthcare providers in your care team. Share this with your family or caregivers too.  DATE " PREPARED: 10/22/2021  What we talked about: Diabetes control and monitoring blood sugars. During the visit you were taught how to use a glucometer.                                                   What I need to do: Check your blood sugar once daily as directed.        What I did and when I did it:                                              My follow-up plan:                 Questions I want to ask:              If you have any questions about your action plan, call Pooja Boyce, Tristan, Phone: [phone] , Monday-Friday 8-4:30pm.           PERSONAL MEDICATION LIST FOR Dimitri Almaraz,  1955     This medication list was made for you after we talked. We also used information from your doctor's chart.      Use blank rows to add new medications. Then fill in the dates you started using them.    Cross out medications when you no longer use them. Then write the date and why you stopped using them.    Ask your doctors, pharmacists, and other healthcare providers to update this list at every visit. Keep this list up-to-date with:       Prescription medications    Over the counter drugs     Herbals    Vitamins    Minerals      If you go to the hospital or emergency room, take this list with you. Share this with your family or caregivers too.     DATE PREPARED: 10/22/2021  Allergies or side effects: Patient has no known allergies.     Medication:  ACCU-CHEK GUIDE VI STRP      How I use it:  Use to test blood sugar one time daily or as directed.      Why I use it: Type 2 diabetes mellitus without complication, without long-term current use of insulin (H)    Prescriber:  Helio Nava MD      Date I started using it:       Date I stopped using it:         Why I stopped using it:            Medication:  ACCU-CHEK SOFTCLIX LANCETS MISC      How I use it:  1 each daily Use to test blood sugar one time daily or as directed.      Why I use it: Type 2 diabetes mellitus without complication, without long-term  current use of insulin (H)    Prescriber:  Helio Nava MD      Date I started using it:       Date I stopped using it:         Why I stopped using it:            Medication:  aspirin 81 mg chewable tablet      How I use it:  [ASPIRIN 81 MG CHEWABLE TABLET] Chew 81 mg daily.      Why I use it:  heart protection    Prescriber:  Chin Suarez MD      Date I started using it:       Date I stopped using it:         Why I stopped using it:            Medication:  atorvastatin (LIPITOR) 40 MG tablet      How I use it:  [ATORVASTATIN (LIPITOR) 40 MG TABLET] Take 1 tablet (40 mg total) by mouth daily.      Why I use it: Hyperlipemia    Prescriber:  Helio Nava MD      Date I started using it:       Date I stopped using it:         Why I stopped using it:            Medication:  meloxicam (MOBIC) 15 MG tablet      How I use it:  [MELOXICAM (MOBIC) 15 MG TABLET] Take 1 tablet (15 mg total) by mouth daily.      Why I use it: Sprain of hand, right, initial encounter    Prescriber:  Helio Nava MD      Date I started using it:       Date I stopped using it:         Why I stopped using it:            Medication:  metFORMIN (GLUCOPHAGE) 500 MG tablet      How I use it:  [METFORMIN (GLUCOPHAGE) 500 MG TABLET] Take 1 tablet (500 mg total) by mouth 2 (two) times a day with meals.      Why I use it: Type 2 diabetes mellitus without complication, without long-term current use of insulin (H)    Prescriber:  Helio Nava MD      Date I started using it:       Date I stopped using it:         Why I stopped using it:            Medication:         How I use it:         Why I use it:      Prescriber:         Date I started using it:       Date I stopped using it:         Why I stopped using it:            Medication:         How I use it:         Why I use it:      Prescriber:         Date I started using it:       Date I stopped using it:         Why I stopped using it:            Medication:         How I use it:         Why I use it:       Prescriber:         Date I started using it:       Date I stopped using it:         Why I stopped using it:              Other Information:     If you have any questions about your medication list, call Pooja Boyce PharmD, Phone: [phone] , Monday-Friday 8-4:30pm.    According to the Paperwork Reduction Act of 1995, no persons are required to respond to a collection of information unless it displays a valid OMB control number. The valid OMB number for this information collection is [id number]. The time required to complete this information collection is estimated to average 40 minutes per response, including the time to review instructions, searching existing data resources, gather the data needed, and complete and review the information collection. If you have any comments concerning the accuracy of the time estimate(s) or suggestions for improving this form, please write to: CMS, Attn: SERGIO Reports Clearance Officer, [address] [place] [phone].

## 2021-11-01 ENCOUNTER — TELEPHONE (OUTPATIENT)
Dept: FAMILY MEDICINE | Facility: CLINIC | Age: 66
End: 2021-11-01

## 2021-11-01 DIAGNOSIS — E11.9 TYPE 2 DIABETES MELLITUS WITHOUT COMPLICATION, WITHOUT LONG-TERM CURRENT USE OF INSULIN (H): Primary | ICD-10-CM

## 2021-11-01 RX ORDER — LANCING DEVICE
EACH MISCELLANEOUS
Qty: 1 EACH | Refills: 0 | Status: SHIPPED | OUTPATIENT
Start: 2021-11-01 | End: 2023-03-14

## 2021-11-01 NOTE — TELEPHONE ENCOUNTER
Reason for Call:  Other prescription    Detailed comments: Patient states [facility] fax several request to get Accu-check guide test strip switch to one-touch because his insurance does not cover Accu-check brand. They were unable to get a response and told patient to call. Can Isak or Dr. Nava get this prescription switch tomorrow?    Phone Number Patient can be reached at: Cell number on file:    Telephone Information:   Mobile [phone]       Best Time: anytime    Can we leave a detailed message on this number? YES    Call taken on 11/1/2021 at 2:47 PM by Arina Elkins

## 2021-12-09 ENCOUNTER — NURSE TRIAGE (OUTPATIENT)
Dept: NURSING | Facility: CLINIC | Age: 66
End: 2021-12-09
Payer: COMMERCIAL

## 2021-12-09 NOTE — TELEPHONE ENCOUNTER
RN triage   Call from pt   Pt states he had the Mitul and Mitul vaccine last spring   Pt states he is working part time now and wants to get a booster   Pt wants to know which booster should he get ?   J and J - moderna - pfizer ?    Please advise     Iris Rhoades RN  [facility]  Triage Nurse Advisor  COVID 19 Nurse Triage Plan/Patient Instructions    Please be aware that novel coronavirus (COVID-19) may be circulating in the community. If you develop symptoms such as fever, cough, or SOB or if you have concerns about the presence of another infection including coronavirus (COVID-19), please contact your health care provider or visit [email or web address].     Disposition/Instructions    Home care recommended. Follow home care protocol based instructions.    Thank you for taking steps to prevent the spread of this virus.  o Limit your contact with others.  o Wear a simple mask to cover your cough.  o Wash your hands well and often.    Resources    [facility]: About COVID-19: [email or web address]    CDC: What to Do If You're Sick: www.cdc.gov/coronavirus/2019-ncov/about/steps-when-sick.html    CDC: Ending Home Isolation: www.cdc.gov/coronavirus/2019-ncov/hcp/disposition-in-home-patients.html     CDC: Caring for Someone: www.cdc.gov/coronavirus/2019-ncov/if-you-are-sick/care-for-someone.html     [facility]: Interim Guidance for Hospital Discharge to Home: www.health.[facility].[place]/diseases/coronavirus/hcp/hospdischarge.pdf    [facility] clinical trials (COVID-19 research studies): [email or web address].[email or web address]/n-clinical-trials     Below are the COVID-19 hotlines at the [facility] ([facility]). Interpreters are available.   o For health questions: Call 897-751-7101 or 1-516.418.2608 (7 a.m. to 7 p.m.)  o For questions about schools and childcare: Call 836-535-3551 or 1-967.355.7810 (7 a.m. to 7 p.m.)                     Reason for Disposition    COVID-19 vaccine, Frequently Asked  Questions (FAQs)    Protocols used: CORONAVIRUS (COVID-19) VACCINE QUESTIONS AND [id number] 8.25.2021

## 2021-12-09 NOTE — TELEPHONE ENCOUNTER
Appointment made for patient tomorrow at 9:15 to receive his moderna booster.    Sonal MERCADO RN

## 2021-12-10 ENCOUNTER — IMMUNIZATION (OUTPATIENT)
Dept: NURSING | Facility: CLINIC | Age: 66
End: 2021-12-10
Payer: COMMERCIAL

## 2021-12-10 PROCEDURE — 91306 COVID-19,PF,MODERNA (18+ YRS BOOSTER .25ML): CPT

## 2021-12-10 PROCEDURE — 0064A COVID-19,PF,MODERNA (18+ YRS BOOSTER .25ML): CPT

## 2022-01-17 DIAGNOSIS — Z76.0 ENCOUNTER FOR MEDICATION REFILL: Primary | ICD-10-CM

## 2022-01-17 DIAGNOSIS — E11.9 TYPE 2 DIABETES MELLITUS WITHOUT COMPLICATION, WITHOUT LONG-TERM CURRENT USE OF INSULIN (H): ICD-10-CM

## 2022-01-17 DIAGNOSIS — E78.5 HYPERLIPEMIA: ICD-10-CM

## 2022-01-17 NOTE — TELEPHONE ENCOUNTER
Reason for Call:  Medication or medication refill:    Do you use a [facility]?  Name of the pharmacy and phone number for the current request:  [facility] [address]    Name of the medication requested: metformin and lipitor    Other request:     Can we leave a detailed message on this number? Not Applicable    Phone number patient can be reached at: Home number on file [phone] (home)    Best Time: asap   Pt is out of meds    Call taken on 1/17/2022 at 10:49 AM by Jessika Sood

## 2022-01-18 RX ORDER — ATORVASTATIN CALCIUM 40 MG/1
40 TABLET, FILM COATED ORAL DAILY
Qty: 90 TABLET | Refills: 3 | Status: SHIPPED | OUTPATIENT
Start: 2022-01-18 | End: 2022-08-30

## 2022-03-01 ENCOUNTER — TRANSFERRED RECORDS (OUTPATIENT)
Dept: HEALTH INFORMATION MANAGEMENT | Facility: CLINIC | Age: 67
End: 2022-03-01
Payer: COMMERCIAL

## 2022-03-01 LAB — RETINOPATHY: POSITIVE

## 2022-03-02 ENCOUNTER — TELEPHONE (OUTPATIENT)
Dept: CARDIOLOGY | Facility: CLINIC | Age: 67
End: 2022-03-02
Payer: COMMERCIAL

## 2022-03-02 DIAGNOSIS — E11.9 TYPE 2 DIABETES MELLITUS WITHOUT COMPLICATION, WITHOUT LONG-TERM CURRENT USE OF INSULIN (H): ICD-10-CM

## 2022-03-02 DIAGNOSIS — I25.83 CORONARY ATHEROSCLEROSIS DUE TO LIPID RICH PLAQUE: Primary | ICD-10-CM

## 2022-03-02 NOTE — TELEPHONE ENCOUNTER
Pt called in directly to arrange for follow up appt with [facility]. He is overdue for annual follow up so reassured him that yes, we will get him in for this appt. He also wants his wife to be seen, if able, and do back to back appts with Dr. Suarez. Transferred to schedulers to have this arranged. Reviewed last chart note with Dr. Suarez as patient inquired if he should have any pre-appointment testing or labs.     Per 12/8/2020 OV note with LBF:  Plan  1.  Work on weight loss.  2.  Follow-up me in 1 year and at that point time consider repeating stress test.  Follow-up me sooner if needed.        Dr. Suarez,  Saw will be coming in for his annual visit with you in May. He wonders if he should have blood work prior- last lipid was with PMD in April 2021 and is in [facility]. Your last note mentioned considering repeat stress test. Would you like to have any stress testing or labs prior to appt or wait until he sees you in May? No new symptoms and actually lost 30 lbs since he last saw you to improve his A1C and control his DM.   Thanks,  Mal

## 2022-03-17 ENCOUNTER — TELEPHONE (OUTPATIENT)
Dept: FAMILY MEDICINE | Facility: CLINIC | Age: 67
End: 2022-03-17
Payer: COMMERCIAL

## 2022-03-17 NOTE — TELEPHONE ENCOUNTER
Pt actually came to clinic and CMT sat down with pt.  Gave pt copy of last OV notes from [facility]. After speaking to pt, encouraged pt to call [facility] and ask about the retinopathy. .  Also rescheduled per op for cataracts in April with PCP per pt request.  Scheduled pre op for 3/30 and lab for 3/28.  Pt notified.      Pt was caught unawares about the retinopathy dx.  PCP notified  thanks

## 2022-03-17 NOTE — TELEPHONE ENCOUNTER
Called pt to ask question below.  Left VM to call clinic.  1st attempt.      Pt is positive for retinopathy from recent visit at [facility].  Please ask question below when calls back and if not aware - ok to relay.  Thanks          ----- Message from Helio Nava MD sent at 3/17/2022 10:56 AM CDT -----  Is patient aware of test results from eye exam?

## 2022-03-28 ENCOUNTER — LAB (OUTPATIENT)
Dept: LAB | Facility: CLINIC | Age: 67
End: 2022-03-28
Payer: COMMERCIAL

## 2022-03-28 DIAGNOSIS — I25.83 CORONARY ATHEROSCLEROSIS DUE TO LIPID RICH PLAQUE: ICD-10-CM

## 2022-03-28 DIAGNOSIS — E11.9 TYPE 2 DIABETES MELLITUS WITHOUT COMPLICATION, WITHOUT LONG-TERM CURRENT USE OF INSULIN (H): ICD-10-CM

## 2022-03-28 LAB
CHOLEST SERPL-MCNC: 110 MG/DL
FASTING STATUS PATIENT QL REPORTED: YES
HBA1C MFR BLD: 5.7 % (ref 0–5.6)
HDLC SERPL-MCNC: 40 MG/DL
LDLC SERPL CALC-MCNC: 56 MG/DL
TRIGL SERPL-MCNC: 70 MG/DL

## 2022-03-28 PROCEDURE — 83036 HEMOGLOBIN GLYCOSYLATED A1C: CPT

## 2022-03-28 PROCEDURE — 36415 COLL VENOUS BLD VENIPUNCTURE: CPT

## 2022-03-28 PROCEDURE — 80061 LIPID PANEL: CPT

## 2022-03-30 ENCOUNTER — OFFICE VISIT (OUTPATIENT)
Dept: FAMILY MEDICINE | Facility: CLINIC | Age: 67
End: 2022-03-30
Payer: COMMERCIAL

## 2022-03-30 VITALS
OXYGEN SATURATION: 98 % | BODY MASS INDEX: 24.95 KG/M2 | HEIGHT: 66 IN | WEIGHT: 155.25 LBS | SYSTOLIC BLOOD PRESSURE: 124 MMHG | HEART RATE: 55 BPM | DIASTOLIC BLOOD PRESSURE: 68 MMHG | TEMPERATURE: 97.7 F

## 2022-03-30 DIAGNOSIS — Z11.59 NEED FOR HEPATITIS C SCREENING TEST: ICD-10-CM

## 2022-03-30 DIAGNOSIS — H25.011 CORTICAL AGE-RELATED CATARACT OF RIGHT EYE: ICD-10-CM

## 2022-03-30 DIAGNOSIS — E78.01 FAMILIAL HYPERCHOLESTEROLEMIA: ICD-10-CM

## 2022-03-30 DIAGNOSIS — H25.012 CORTICAL AGE-RELATED CATARACT OF LEFT EYE: ICD-10-CM

## 2022-03-30 DIAGNOSIS — Z01.818 PREOP GENERAL PHYSICAL EXAM: Primary | ICD-10-CM

## 2022-03-30 PROCEDURE — 99214 OFFICE O/P EST MOD 30 MIN: CPT | Performed by: FAMILY MEDICINE

## 2022-03-30 NOTE — PROGRESS NOTES
[address]  [place] [address]  Phone: [phone]  Fax: [phone]  Primary Provider: Rey Fraser  Pre-op Performing Provider: REY FRASER      PREOPERATIVE EVALUATION:  Today's date: 3/30/2022    Dimitri Almaraz is a 66 year old male who presents for a preoperative evaluation.    Surgical Information:  Surgery/Procedure: Cataract removal, lenses inserted  Surgery Location: [facility]   Surgeon:   Surgery Date: 04/04/2022, 04/18/2022  Time of Surgery: 12:20pm  Where patient plans to recover: At home with family  Fax number for surgical facility: [facility] Fax: [phone]    Type of Anesthesia Anticipated: Choice    Assessment & Plan     The proposed surgical procedure is considered LOW risk.    Preop general physical exam    Patient can proceed for the surgeries he has extremely well controlled diabetes has no known anesthesia risks or side effects.  Pre and perioperative    Guidelines discussed with the patient    Cortical age-related cataract of right eye      Cortical age-related cataract of left eye             Risks and Recommendations:  The patient has the following additional risks and recommendations for perioperative complications:   - No identified additional risk factors other than previously addressed      RECOMMENDATION:  APPROVAL GIVEN to proceed with proposed procedure, without further diagnostic evaluation.        Subjective     HPI related to upcoming procedure:   66-year-old gentleman here for preop evaluation he is scheduled to have both senile cataracts replaced with artificial lenses and April.  He reports has been doing very well with his glycemic control.  Recently had an A1c that was lower at 5.7.  No recent cold cough sore throat or fever noted    Preop Questions 3/30/2022   1. Have you ever had a heart attack or stroke? No   2. Have you ever had surgery on your heart or blood vessels, such as a stent placement, a  coronary artery bypass, or surgery on an artery in your head, neck, heart, or legs? No   3. Do you have chest pain with activity? No   4. Do you have a history of  heart failure? No   5. Do you currently have a cold, bronchitis or symptoms of other infection? No   6. Do you have a cough, shortness of breath, or wheezing? No   7. Do you or anyone in your family have previous history of blood clots? No   8. Do you or does anyone in your family have a serious bleeding problem such as prolonged bleeding following surgeries or cuts? No   9. Have you ever had problems with anemia or been told to take iron pills? No   10. Have you had any abnormal blood loss such as black, tarry or bloody stools? No   11. Have you ever had a blood transfusion? No   12. Are you willing to have a blood transfusion if it is medically needed before, during, or after your surgery? Yes   13. Have you or any of your relatives ever had problems with anesthesia? No   14. Do you have sleep apnea, excessive snoring or daytime drowsiness? No   15. Do you have any artifical heart valves or other implanted medical devices like a pacemaker, defibrillator, or continuous glucose monitor? No   16. Do you have artificial joints? No   17. Are you allergic to latex? No                 Review of Systems  CONSTITUTIONAL: NEGATIVE for fever, chills, change in weight  INTEGUMENTARY/SKIN: NEGATIVE for worrisome rashes, moles or lesions  EYES: NEGATIVE for vision changes or irritation  ENT/MOUTH: NEGATIVE for ear, mouth and throat problems  RESP: NEGATIVE for significant cough or SOB  CV: NEGATIVE for chest pain, palpitations or peripheral edema  GI: NEGATIVE for nausea, abdominal pain, heartburn, or change in bowel habits  : NEGATIVE for frequency, dysuria, or hematuria  MUSCULOSKELETAL: NEGATIVE for significant arthralgias or myalgia  NEURO: NEGATIVE for weakness, dizziness or paresthesias  ENDOCRINE: NEGATIVE for temperature intolerance, skin/hair  changes  HEME: NEGATIVE for bleeding problems  PSYCHIATRIC: NEGATIVE for changes in mood or affect    Patient Active Problem List    Diagnosis Date Noted     Hypercholesteremia      Priority: Medium     Created by Conversion         Type 2 diabetes mellitus without complication (H) 08/10/2016     Priority: Medium     Coronary Artery Disease      Priority: Medium     Created by Conversion  [facility] Annotation: Apr 2 2012  2:52PM - Jai Robles: 30% Proximal   LAD  Replacement Utility updated for latest IMO load         Diverticulosis      Priority: Medium     Created by Conversion  Replacement Utility updated for latest IMO load         Osteoarthritis 02/12/2015     Priority: Medium     R knee.         Hyperglycemia      Priority: Medium     Created by Conversion         Benign Polyps Of The Large Intestine      Priority: Medium     Created by Conversion         Obesity      Priority: Medium     Created by Conversion         Obstructive Sleep Apnea      Priority: Medium     Created by Conversion  [facility] Annotation: Jul 7 2009  7:49AM - Jai Robles: On CPAP          No past medical history on file.  Past Surgical History:   Procedure Laterality Date     HC REMOVE TONSILS/ADENOIDS,<11 Y/O      Description: Tonsillectomy With Adenoidectomy;  Recorded: 01/29/2009;     HC REPAIR OF NASAL SEPTUM      Description: Septoplasty;  Recorded: 01/29/2009;     Current Outpatient Medications   Medication Sig Dispense Refill     aspirin 81 mg chewable tablet [ASPIRIN 81 MG CHEWABLE TABLET] Chew 81 mg daily.       atorvastatin (LIPITOR) 40 MG tablet Take 1 tablet (40 mg) by mouth daily 90 tablet 3     blood glucose (ACCU-CHEK GUIDE) test strip Use to test blood sugar one time daily or as directed. 100 strip 3     blood glucose (NO BRAND SPECIFIED) lancets standard Use to test blood sugar 1 times daily or as directed. 100 each 3     blood glucose (NO BRAND SPECIFIED) lancing device Device to be used with lancets. 1 each 0  "    blood glucose (NO BRAND SPECIFIED) test strip Use to test blood sugar 1 times daily or as directed. 100 strip 3     blood glucose monitoring (SOFTCLIX) lancets 1 each daily Use to test blood sugar one time daily or as directed. 100 each 3     meloxicam (MOBIC) 15 MG tablet [MELOXICAM (MOBIC) 15 MG TABLET] Take 1 tablet (15 mg total) by mouth daily. 30 tablet 0     metFORMIN (GLUCOPHAGE) 500 MG tablet Take 1 tablet (500 mg) by mouth 2 times daily (with meals) 180 tablet 3       No Known Allergies     Social History     Tobacco Use     Smoking status: Former Smoker     Packs/day: 0.25     Years: 40.00     Pack years: 10.00     Types: Cigarettes     Quit date: 5/10/2019     Years since quittin.8     Smokeless tobacco: Never Used     Tobacco comment: 1 pack per week   Substance Use Topics     Alcohol use: Yes     Alcohol/week: 0.0 standard drinks     Comment: Alcoholic Drinks/day: \"some...... weekends\"     Family History   Problem Relation Age of Onset     Heart Disease Mother      Heart Disease Father      Heart Disease Sister      Heart Disease Brother      Heart Disease Sister      Diabetes Sister      Diabetes Brother      Diabetes Brother      Diabetes Brother      Heart Disease Brother      Diabetes Brother      Diabetes Brother      Diabetes Brother      History   Drug Use No         Objective     Ht 1.676 m (5' 6\")   Wt 70.4 kg (155 lb 4 oz)   BMI 25.06 kg/m      Physical Exam    GENERAL APPEARANCE: healthy, alert and no distress     EYES: EOMI,  PERRL     HENT: ear canals and TM's normal and nose and mouth without ulcers or lesions     NECK: no adenopathy, no asymmetry, masses, or scars and thyroid normal to palpation     RESP: lungs clear to auscultation - no rales, rhonchi or wheezes     CV: regular rates and rhythm, normal S1 S2, no S3 or S4 and no murmur, click or rub     ABDOMEN:  soft, nontender, no HSM or masses and bowel sounds normal     MS: extremities normal- no gross deformities noted, no " evidence of inflammation in joints, FROM in all extremities.     SKIN: no suspicious lesions or rashes     NEURO: Normal strength and tone, sensory exam grossly normal, mentation intact and speech normal     PSYCH: mentation appears normal. and affect normal/bright     LYMPHATICS: No cervical adenopathy    Recent Labs   Lab Test 03/28/22  0724 10/01/21  0705 04/14/21  1658 04/13/21  1330 11/25/20  0746   HGB  --   --  14.7  --   --    PLT  --   --  198  --   --    NA  --   --   --  144 142   POTASSIUM  --   --   --  4.9 4.1   CR  --   --   --  1.04 1.12   A1C 5.7* 5.8*  --  6.0* 8.0*        Diagnostics:  No labs were ordered during this visit.   No EKG required for low risk surgery (cataract, skin procedure, breast biopsy, etc).    Revised Cardiac Risk Index (RCRI):  The patient has the following serious cardiovascular risks for perioperative complications:   - No serious cardiac risks = 0 points              Signed Electronically by: Helio Nava MD  Copy of this evaluation report is provided to requesting physician.

## 2022-05-11 ENCOUNTER — TELEPHONE (OUTPATIENT)
Dept: CARDIOLOGY | Facility: CLINIC | Age: 67
End: 2022-05-11
Payer: COMMERCIAL

## 2022-05-11 NOTE — TELEPHONE ENCOUNTER
Received a voicemail from Saw. He had a lab appt scheduled 5/13 and actually had the blood work for this appt done early on 3/30 at his [facility] office. He wonders if he still needs to have blood work done again. The labs were already used that were ordered by [facility]. Writer called back Saw and left a msg that no, he does not need another appt and repeat blood work. We will cancel it for him. Direct line provided should he have any additional questions. -ninfa

## 2022-05-16 ENCOUNTER — OFFICE VISIT (OUTPATIENT)
Dept: CARDIOLOGY | Facility: CLINIC | Age: 67
End: 2022-05-16
Payer: COMMERCIAL

## 2022-05-16 VITALS
DIASTOLIC BLOOD PRESSURE: 56 MMHG | SYSTOLIC BLOOD PRESSURE: 114 MMHG | WEIGHT: 155 LBS | HEART RATE: 72 BPM | BODY MASS INDEX: 25.02 KG/M2 | OXYGEN SATURATION: 99 % | RESPIRATION RATE: 16 BRPM

## 2022-05-16 DIAGNOSIS — E78.00 HYPERCHOLESTEREMIA: ICD-10-CM

## 2022-05-16 DIAGNOSIS — R01.1 HEART MURMUR: ICD-10-CM

## 2022-05-16 DIAGNOSIS — E11.9 TYPE 2 DIABETES MELLITUS WITHOUT COMPLICATION, WITHOUT LONG-TERM CURRENT USE OF INSULIN (H): ICD-10-CM

## 2022-05-16 DIAGNOSIS — G47.33 OBSTRUCTIVE SLEEP APNEA (ADULT) (PEDIATRIC): ICD-10-CM

## 2022-05-16 DIAGNOSIS — I25.83 CORONARY ATHEROSCLEROSIS DUE TO LIPID RICH PLAQUE: Primary | ICD-10-CM

## 2022-05-16 PROCEDURE — 99214 OFFICE O/P EST MOD 30 MIN: CPT | Performed by: INTERNAL MEDICINE

## 2022-05-16 NOTE — PATIENT INSTRUCTIONS
Mr Dimitri Almaraz,  I enjoyed visiting with you again today.  I am glad to hear you are doing well.  Per our conversation consider not taking the aspirin. We will check the echo or ultrasound of the heart.  I will plan on seeing you 2 years.  Chin Suarez

## 2022-05-16 NOTE — PROGRESS NOTES
[facility]  Heart Care Clinic Follow-up Note    Assessment & Plan        (I25.10,  I25.83) Coronary atherosclerosis due to lipid rich plaque  (primary encounter diagnosis)  Comment: Minimal coronary artery disease.  He underwent invasive angiogram August 2011 that showed only mild disease in the left anterior descending midportion.  He underwent repeat CT angiogram in 2013 which showed normal left main and insignificant left anterior descending disease.  The circumflex and right coronary artery were normal.  He had stress nuclear in 2019 which was normal as well. We'll continue to work on prevention given strong family history.    (E78.00) Hypercholesteremia  Comment: Cholesterol excellent at 110 with an LDL of 56.  Continue current atorvastatin.    (E11.9) Type 2 diabetes mellitus without complication, without long-term current use of insulin (H)  Comment: Lost weight and hemoglobin A1c 5.7.    (G47.33) Obstructive Sleep Apnea  Comment: Resolved with weight loss and never had CPAP.    (R01.1) Heart murmur  Comment: Sounds like mild aortic stenosis, new since 2 years ago, check echo.    Plan  1.  Check echocardiogram looking at aortic valve.  2.  Follow-up with me in 2 years or sooner if needed.    Subjective  CC: 67-year-old white gentleman here for a follow-up visit.  Still living independently with wife, did do some work during the winter, he got bored.  Is able to go for bike rides up to 20 miles 12 mph, without any issues.  No syncope, dizziness, chest discomfort, palpitations, shortness of breath, PND, orthopnea or peripheral edema.  He was working on a basement door this morning, he is getting ready to take his motorhome up and down the [place].    Medications  Current Outpatient Medications   Medication Sig Dispense Refill     atorvastatin (LIPITOR) 40 MG tablet Take 1 tablet (40 mg) by mouth daily 90 tablet 3     blood glucose (ACCU-CHEK GUIDE) test strip Use to test blood sugar one time  daily or as directed. 100 strip 3     blood glucose (NO BRAND SPECIFIED) lancets standard Use to test blood sugar 1 times daily or as directed. 100 each 3     blood glucose (NO BRAND SPECIFIED) lancing device Device to be used with lancets. 1 each 0     blood glucose (NO BRAND SPECIFIED) test strip Use to test blood sugar 1 times daily or as directed. 100 strip 3     blood glucose monitoring (SOFTCLIX) lancets 1 each daily Use to test blood sugar one time daily or as directed. 100 each 3     metFORMIN (GLUCOPHAGE) 500 MG tablet Take 1 tablet (500 mg) by mouth 2 times daily (with meals) 180 tablet 3     aspirin 81 mg chewable tablet [ASPIRIN 81 MG CHEWABLE TABLET] Chew 81 mg daily. (Patient not taking: Reported on 5/16/2022)         Objective  /56 (BP Location: Right arm, Patient Position: Sitting, Cuff Size: Adult Regular)   Pulse 72   Resp 16   Wt 70.3 kg (155 lb)   SpO2 99%   BMI 25.02 kg/m      General Appearance:    Alert, cooperative, no distress, appears stated age   Head:    Normocephalic, without obvious abnormality, atraumatic   Throat:   Lips, mucosa, and tongue normal; teeth and gums normal   Neck:   Supple, symmetrical, trachea midline, no adenopathy;        thyroid:  No enlargement/tenderness/nodules; no carotid    bruit or JVD   Back:     Symmetric, no curvature, ROM normal, no CVA tenderness   Lungs:     Clear to auscultation bilaterally, respirations unlabored   Chest wall:    No tenderness or deformity   Heart:    Regular rate and rhythm, S1 and S2 normal, 1-2/6 crescendo decrescendo systolic ejection murmur, no rub or gallop   Abdomen:     Soft, non-tender, bowel sounds active all four quadrants,     no masses, no organomegaly   Extremities:   Normal, atraumatic, no cyanosis or edema   Pulses:   2+ and symmetric all extremities   Skin:   Skin color, texture, turgor normal, no rashes or lesions     Results    Lab Results personally reviewed   Lab Results   Component Value Date    CHOL 110  03/28/2022    CHOL 129 04/13/2021     Lab Results   Component Value Date    HDL 40 03/28/2022    HDL 37 (L) 04/13/2021     No components found for: LDLCALC  Lab Results   Component Value Date    TRIG 70 03/28/2022    TRIG 126 04/13/2021     Lab Results   Component Value Date    WBC 7.8 04/14/2021    HGB 14.7 04/14/2021    HCT 45.0 04/14/2021     04/14/2021     Lab Results   Component Value Date    BUN 14 04/13/2021     04/13/2021    CO2 26 04/13/2021

## 2022-05-16 NOTE — LETTER
5/16/2022    Helio Nava MD  [address]  [place] [address]    RE: Dimitri Almaraz       Dear Colleague,     I had the pleasure of seeing Dimitri Almaraz in the [facility].      [facility]  Heart Care Clinic Follow-up Note    Assessment & Plan        (I25.10,  I25.83) Coronary atherosclerosis due to lipid rich plaque  (primary encounter diagnosis)  Comment: Minimal coronary artery disease.  He underwent invasive angiogram August 2011 that showed only mild disease in the left anterior descending midportion.  He underwent repeat CT angiogram in 2013 which showed normal left main and insignificant left anterior descending disease.  The circumflex and right coronary artery were normal.  He had stress nuclear in 2019 which was normal as well. We'll continue to work on prevention given strong family history.    (E78.00) Hypercholesteremia  Comment: Cholesterol excellent at 110 with an LDL of 56.  Continue current atorvastatin.    (E11.9) Type 2 diabetes mellitus without complication, without long-term current use of insulin (H)  Comment: Lost weight and hemoglobin A1c 5.7.    (G47.33) Obstructive Sleep Apnea  Comment: Resolved with weight loss and never had CPAP.    (R01.1) Heart murmur  Comment: Sounds like mild aortic stenosis, new since 2 years ago, check echo.    Plan  1.  Check echocardiogram looking at aortic valve.  2.  Follow-up with me in 2 years or sooner if needed.    Subjective  CC: 67-year-old white gentleman here for a follow-up visit.  Still living independently with wife, did do some work during the winter, he got bored.  Is able to go for bike rides up to 20 miles 12 mph, without any issues.  No syncope, dizziness, chest discomfort, palpitations, shortness of breath, PND, orthopnea or peripheral edema.  He was working on a basement door this morning, he is getting ready to take his motorhome up and down the [place].    Medications  Current Outpatient  Medications   Medication Sig Dispense Refill     atorvastatin (LIPITOR) 40 MG tablet Take 1 tablet (40 mg) by mouth daily 90 tablet 3     blood glucose (ACCU-CHEK GUIDE) test strip Use to test blood sugar one time daily or as directed. 100 strip 3     blood glucose (NO BRAND SPECIFIED) lancets standard Use to test blood sugar 1 times daily or as directed. 100 each 3     blood glucose (NO BRAND SPECIFIED) lancing device Device to be used with lancets. 1 each 0     blood glucose (NO BRAND SPECIFIED) test strip Use to test blood sugar 1 times daily or as directed. 100 strip 3     blood glucose monitoring (SOFTCLIX) lancets 1 each daily Use to test blood sugar one time daily or as directed. 100 each 3     metFORMIN (GLUCOPHAGE) 500 MG tablet Take 1 tablet (500 mg) by mouth 2 times daily (with meals) 180 tablet 3     aspirin 81 mg chewable tablet [ASPIRIN 81 MG CHEWABLE TABLET] Chew 81 mg daily. (Patient not taking: Reported on 5/16/2022)         Objective  /56 (BP Location: Right arm, Patient Position: Sitting, Cuff Size: Adult Regular)   Pulse 72   Resp 16   Wt 70.3 kg (155 lb)   SpO2 99%   BMI 25.02 kg/m      General Appearance:    Alert, cooperative, no distress, appears stated age   Head:    Normocephalic, without obvious abnormality, atraumatic   Throat:   Lips, mucosa, and tongue normal; teeth and gums normal   Neck:   Supple, symmetrical, trachea midline, no adenopathy;        thyroid:  No enlargement/tenderness/nodules; no carotid    bruit or JVD   Back:     Symmetric, no curvature, ROM normal, no CVA tenderness   Lungs:     Clear to auscultation bilaterally, respirations unlabored   Chest wall:    No tenderness or deformity   Heart:    Regular rate and rhythm, S1 and S2 normal, 1-2/6 crescendo decrescendo systolic ejection murmur, no rub or gallop   Abdomen:     Soft, non-tender, bowel sounds active all four quadrants,     no masses, no organomegaly   Extremities:   Normal, atraumatic, no cyanosis or  edema   Pulses:   2+ and symmetric all extremities   Skin:   Skin color, texture, turgor normal, no rashes or lesions     Results    Lab Results personally reviewed   Lab Results   Component Value Date    CHOL 110 03/28/2022    CHOL 129 04/13/2021     Lab Results   Component Value Date    HDL 40 03/28/2022    HDL 37 (L) 04/13/2021     No components found for: LDLCALC  Lab Results   Component Value Date    TRIG 70 03/28/2022    TRIG 126 04/13/2021     Lab Results   Component Value Date    WBC 7.8 04/14/2021    HGB 14.7 04/14/2021    HCT 45.0 04/14/2021     04/14/2021     Lab Results   Component Value Date    BUN 14 04/13/2021     04/13/2021    CO2 26 04/13/2021               Thank you for allowing me to participate in the care of your patient.      Sincerely,     KOKO BEASLEY MD     [facility]  cc:   No referring provider defined for this encounter.

## 2022-05-31 ENCOUNTER — HOSPITAL ENCOUNTER (OUTPATIENT)
Dept: CARDIOLOGY | Facility: CLINIC | Age: 67
Discharge: HOME | End: 2022-05-31
Attending: INTERNAL MEDICINE | Admitting: INTERNAL MEDICINE
Payer: COMMERCIAL

## 2022-05-31 DIAGNOSIS — R01.1 HEART MURMUR: ICD-10-CM

## 2022-05-31 LAB — LVEF ECHO: NORMAL

## 2022-05-31 PROCEDURE — 93306 TTE W/DOPPLER COMPLETE: CPT

## 2022-05-31 PROCEDURE — 93306 TTE W/DOPPLER COMPLETE: CPT | Mod: 26 | Performed by: INTERNAL MEDICINE

## 2022-06-07 DIAGNOSIS — E11.9 TYPE 2 DIABETES MELLITUS WITHOUT COMPLICATION, WITHOUT LONG-TERM CURRENT USE OF INSULIN (H): ICD-10-CM

## 2022-06-07 DIAGNOSIS — E78.5 HYPERLIPEMIA: ICD-10-CM

## 2022-06-07 DIAGNOSIS — Z76.0 ENCOUNTER FOR MEDICATION REFILL: Primary | ICD-10-CM

## 2022-06-07 RX ORDER — ATORVASTATIN CALCIUM 40 MG/1
40 TABLET, FILM COATED ORAL DAILY
Qty: 90 TABLET | Refills: 3 | Status: CANCELLED | OUTPATIENT
Start: 2022-06-07

## 2022-06-07 NOTE — TELEPHONE ENCOUNTER
Unable to reach the patient. When the patient calls back, please collect preferred pharmacy so we can send if he needs.

## 2022-07-18 ENCOUNTER — TRANSFERRED RECORDS (OUTPATIENT)
Dept: HEALTH INFORMATION MANAGEMENT | Facility: CLINIC | Age: 67
End: 2022-07-18

## 2022-08-16 ENCOUNTER — IMMUNIZATION (OUTPATIENT)
Dept: NURSING | Facility: CLINIC | Age: 67
End: 2022-08-16
Payer: COMMERCIAL

## 2022-08-16 PROCEDURE — 91306 COVID-19,PF,MODERNA (18+ YRS BOOSTER .25ML): CPT

## 2022-08-16 PROCEDURE — 0064A COVID-19,PF,MODERNA (18+ YRS BOOSTER .25ML): CPT

## 2022-08-30 ENCOUNTER — OFFICE VISIT (OUTPATIENT)
Dept: FAMILY MEDICINE | Facility: CLINIC | Age: 67
End: 2022-08-30
Payer: COMMERCIAL

## 2022-08-30 VITALS
WEIGHT: 157.25 LBS | HEART RATE: 66 BPM | SYSTOLIC BLOOD PRESSURE: 122 MMHG | DIASTOLIC BLOOD PRESSURE: 64 MMHG | RESPIRATION RATE: 16 BRPM | HEIGHT: 66 IN | BODY MASS INDEX: 25.27 KG/M2 | TEMPERATURE: 97.8 F | OXYGEN SATURATION: 100 %

## 2022-08-30 DIAGNOSIS — E78.49 OTHER HYPERLIPIDEMIA: ICD-10-CM

## 2022-08-30 DIAGNOSIS — Z76.0 ENCOUNTER FOR MEDICATION REFILL: ICD-10-CM

## 2022-08-30 DIAGNOSIS — Z01.818 PREOP GENERAL PHYSICAL EXAM: Primary | ICD-10-CM

## 2022-08-30 DIAGNOSIS — E11.9 TYPE 2 DIABETES MELLITUS WITHOUT COMPLICATION, WITHOUT LONG-TERM CURRENT USE OF INSULIN (H): ICD-10-CM

## 2022-08-30 PROCEDURE — 90677 PCV20 VACCINE IM: CPT | Performed by: FAMILY MEDICINE

## 2022-08-30 PROCEDURE — 99214 OFFICE O/P EST MOD 30 MIN: CPT | Mod: 25 | Performed by: FAMILY MEDICINE

## 2022-08-30 PROCEDURE — G0009 ADMIN PNEUMOCOCCAL VACCINE: HCPCS | Performed by: FAMILY MEDICINE

## 2022-08-30 RX ORDER — ATORVASTATIN CALCIUM 40 MG/1
40 TABLET, FILM COATED ORAL DAILY
Qty: 90 TABLET | Refills: 3 | Status: SHIPPED | OUTPATIENT
Start: 2022-08-30 | End: 2023-03-14

## 2022-08-30 NOTE — PROGRESS NOTES
[address]  [place] [address]  Phone: [phone]  Fax: [phone]  Primary Provider: Rey Fraser  Pre-op Performing Provider: REY FRASER      PREOPERATIVE EVALUATION:  Today's date: 8/30/2022    Dimitri Almaraz is a 67 year old male who presents for a preoperative evaluation.    Surgical Information:  Surgery/Procedure: Right Eye  Intraocular Lens Exchange   Surgery Location: [facility]  Surgeon: Cuco Davila MD  Surgery Date: August 31, 2022  Time of Surgery: 7 :05 am  Where patient plans to recover: At home with family  Fax number for surgical facility: [phone]    Type of Anesthesia Anticipated: Choice    Assessment & Plan     The proposed surgical procedure is considered LOW risk.    Hyperlipemia    Cholesterol check the patient I will recheck it within 3 months    Encounter for medication refill    - atorvastatin (LIPITOR) 40 MG tablet; Take 1 tablet (40 mg) by mouth daily  - metFORMIN (GLUCOPHAGE) 500 MG tablet; Take 1 tablet (500 mg) by mouth 2 times daily (with meals)    Type 2 diabetes mellitus without complication, without long-term current use of insulin (H)      - atorvastatin (LIPITOR) 40 MG tablet; Take 1 tablet (40 mg) by mouth daily  - metFORMIN (GLUCOPHAGE) 500 MG tablet; Take 1 tablet (500 mg) by mouth 2 times daily (with meals)    Preop general physical exam    Patient can proceed for revision of his cataract surgery he is fully immunized.  He had no side effects with the prior surgery with respect to anesthesia he understands that he should not eat after midnight on the day of surgery.           Risks and Recommendations:  The patient has the following additional risks and recommendations for perioperative complications:   - No identified additional risk factors other than previously addressed        RECOMMENDATION:  APPROVAL GIVEN to proceed with proposed procedure, without further diagnostic  evaluation.              Subjective     HPI related to upcoming procedure:   67-year-old male with very well-controlled hyperlipidemia here for preop evaluation for cataract surgery he is having revision to his surgery because he will need new lenses and proceed for replacement bilaterally because of poor visual acuity patient reports that he has had no fever or chills.  He is fully vaccinated against COVID-19.    Preop Questions 8/30/2022   1. Have you ever had a heart attack or stroke? No   2. Have you ever had surgery on your heart or blood vessels, such as a stent placement, a coronary artery bypass, or surgery on an artery in your head, neck, heart, or legs? No   3. Do you have chest pain with activity? No   4. Do you have a history of  heart failure? No   5. Do you currently have a cold, bronchitis or symptoms of other infection? No   6. Do you have a cough, shortness of breath, or wheezing? No   7. Do you or anyone in your family have previous history of blood clots? No   8. Do you or does anyone in your family have a serious bleeding problem such as prolonged bleeding following surgeries or cuts? No   9. Have you ever had problems with anemia or been told to take iron pills? No   10. Have you had any abnormal blood loss such as black, tarry or bloody stools? No   11. Have you ever had a blood transfusion? No   12. Are you willing to have a blood transfusion if it is medically needed before, during, or after your surgery? Yes   13. Have you or any of your relatives ever had problems with anesthesia? No   14. Do you have sleep apnea, excessive snoring or daytime drowsiness? No   15. Do you have any artifical heart valves or other implanted medical devices like a pacemaker, defibrillator, or continuous glucose monitor? No   16. Do you have artificial joints? No   17. Are you allergic to latex? No         Preoperative Review of :            Review of Systems  CONSTITUTIONAL: NEGATIVE for fever, chills, change  in weight  INTEGUMENTARY/SKIN: NEGATIVE for worrisome rashes, moles or lesions  EYES: NEGATIVE for vision changes or irritation  ENT/MOUTH: NEGATIVE for ear, mouth and throat problems  RESP: NEGATIVE for significant cough or SOB  CV: NEGATIVE for chest pain, palpitations or peripheral edema  GI: NEGATIVE for nausea, abdominal pain, heartburn, or change in bowel habits  : NEGATIVE for frequency, dysuria, or hematuria  MUSCULOSKELETAL: NEGATIVE for significant arthralgias or myalgia  NEURO: NEGATIVE for weakness, dizziness or paresthesias  ENDOCRINE: NEGATIVE for temperature intolerance, skin/hair changes  HEME: NEGATIVE for bleeding problems  PSYCHIATRIC: NEGATIVE for changes in mood or affect    Patient Active Problem List    Diagnosis Date Noted     Heart murmur 05/16/2022     Priority: Medium     Hypercholesteremia      Priority: Medium     Created by Conversion         Type 2 diabetes mellitus without complication (H) 08/10/2016     Priority: Medium     Coronary Artery Disease      Priority: Medium     Created by Conversion  [facility] Annotation: Apr 2 2012  2:52PM - Jai Robles: 30% Proximal   LAD  Replacement Utility updated for latest IMO load         Diverticulosis      Priority: Medium     Created by Conversion  Replacement Utility updated for latest IMO load         Osteoarthritis 02/12/2015     Priority: Medium     R knee.         Hyperglycemia      Priority: Medium     Created by Conversion         Benign Polyps Of The Large Intestine      Priority: Medium     Created by Conversion         Obesity      Priority: Medium     Created by Conversion         Obstructive Sleep Apnea      Priority: Medium     Created by Conversion  [facility] Annotation: Jul 7 2009  7:49AM - Jai Robles: On CPAP          History reviewed. No pertinent past medical history.  Past Surgical History:   Procedure Laterality Date     HC REMOVE TONSILS/ADENOIDS,<11 Y/O      Description: Tonsillectomy With Adenoidectomy;   "Recorded: 01/29/2009;      REPAIR OF NASAL SEPTUM      Description: Septoplasty;  Recorded: 01/29/2009;     Current Outpatient Medications   Medication Sig Dispense Refill     aspirin 81 mg chewable tablet Take 81 mg by mouth daily       atorvastatin (LIPITOR) 40 MG tablet Take 1 tablet (40 mg) by mouth daily 90 tablet 3     blood glucose (ACCU-CHEK GUIDE) test strip Use to test blood sugar one time daily or as directed. 100 strip 3     blood glucose (NO BRAND SPECIFIED) lancets standard Use to test blood sugar 1 times daily or as directed. 100 each 3     blood glucose (NO BRAND SPECIFIED) lancing device Device to be used with lancets. 1 each 0     blood glucose (NO BRAND SPECIFIED) test strip Use to test blood sugar 1 times daily or as directed. 100 strip 3     blood glucose monitoring (SOFTCLIX) lancets 1 each daily Use to test blood sugar one time daily or as directed. 100 each 3     metFORMIN (GLUCOPHAGE) 500 MG tablet Take 1 tablet (500 mg) by mouth 2 times daily (with meals) 180 tablet 3       No Known Allergies     Social History     Tobacco Use     Smoking status: Former Smoker     Packs/day: 0.25     Years: 40.00     Pack years: 10.00     Types: Cigarettes     Quit date: 5/10/2019     Years since quitting: 3.3     Smokeless tobacco: Never Used     Tobacco comment: 1 pack per week   Substance Use Topics     Alcohol use: Yes     Alcohol/week: 0.0 standard drinks     Comment: Alcoholic Drinks/day: \"some...... weekends\"     Family History   Problem Relation Age of Onset     Heart Disease Mother      Heart Disease Father      Heart Disease Sister      Heart Disease Brother      Heart Disease Sister      Diabetes Sister      Diabetes Brother      Diabetes Brother      Diabetes Brother      Heart Disease Brother      Diabetes Brother      Diabetes Brother      Diabetes Brother      History   Drug Use No         Objective     /64 (BP Location: Right arm, Patient Position: Sitting, Cuff Size: Adult Regular)  " " Pulse 66   Temp 97.8  F (36.6  C) (Oral)   Resp 16   Ht 1.67 m (5' 5.75\")   Wt 71.3 kg (157 lb 4 oz)   SpO2 100%   BMI 25.58 kg/m      Physical Exam    GENERAL APPEARANCE: healthy, alert and no distress     EYES: EOMI,  PERRL     HENT: ear canals and TM's normal and nose and mouth without ulcers or lesions     NECK: no adenopathy, no asymmetry, masses, or scars and thyroid normal to palpation     RESP: lungs clear to auscultation - no rales, rhonchi or wheezes     CV: regular rates and rhythm, normal S1 S2, no S3 or S4 and no murmur, click or rub     ABDOMEN:  soft, nontender, no HSM or masses and bowel sounds normal     MS: extremities normal- no gross deformities noted, no evidence of inflammation in joints, FROM in all extremities.     SKIN: no suspicious lesions or rashes     NEURO: Normal strength and tone, sensory exam grossly normal, mentation intact and speech normal     PSYCH: mentation appears normal. and affect normal/bright     LYMPHATICS: No cervical adenopathy    Recent Labs   Lab Test 03/28/22  0724 10/01/21  0705 04/14/21  1658 04/13/21  1330 11/25/20  0746   HGB  --   --  14.7  --   --    PLT  --   --  198  --   --    NA  --   --   --  144 142   POTASSIUM  --   --   --  4.9 4.1   CR  --   --   --  1.04 1.12   A1C 5.7* 5.8*  --  6.0* 8.0*        Diagnostics:  No labs were ordered during this visit.              Signed Electronically by: Helio Nava MD  Copy of this evaluation report is provided to requesting physician.      "

## 2022-11-10 ENCOUNTER — TRANSFERRED RECORDS (OUTPATIENT)
Dept: HEALTH INFORMATION MANAGEMENT | Facility: CLINIC | Age: 67
End: 2022-11-10

## 2022-11-10 LAB — RETINOPATHY: NEGATIVE

## 2022-11-11 ENCOUNTER — TELEPHONE (OUTPATIENT)
Dept: FAMILY MEDICINE | Facility: CLINIC | Age: 67
End: 2022-11-11

## 2022-11-11 DIAGNOSIS — E11.9 TYPE 2 DIABETES MELLITUS WITHOUT COMPLICATION, WITHOUT LONG-TERM CURRENT USE OF INSULIN (H): Primary | ICD-10-CM

## 2022-11-11 NOTE — TELEPHONE ENCOUNTER
Order/Referral Request    Who is requesting: Patient    Orders being requested: DM panel lab test to check for A1c, Lipid, etc...    Reason service is needed/diagnosis: DM    When are orders needed by: at provider's convenience    Has this been discussed with Provider: Yes    Does patient have a preference on a Group/Provider/Facility? [facility]    Does patient have an appointment scheduled?: No    Where to send orders:     Okay to leave a detailed message?: Yes at Home number on file [phone]

## 2022-11-11 NOTE — TELEPHONE ENCOUNTER
Dr. Nava- chart reviewed. Last two office visits were for pre-ops (8/20/22 and 3/30/22). Pt is requesting DM panel lab test to check for A1c, Lipid, etc...    - is PCP okay with ordering lab and Pt scheduling lab only appt or should pt schedule office visit.       TAMEKA Mera CemN RN  [facility]

## 2022-11-15 NOTE — TELEPHONE ENCOUNTER
Called pt and would like to labs soon.  Pt is also having increased stress in his life - driving gets upset and freaks out, recently his puppy , home project was a headache, and is not sleeping.   Pt would like an anxiety med to take the edge off.      PCP has no openings due to vacation until 22.  If needs to be seen - would like to do labs for A1C, Lipids, etc same day/      Thoughts?  Thanks

## 2022-11-16 NOTE — TELEPHONE ENCOUNTER
Pt would like to know if PCP will prescribe rx for anxiety. Pt is also requesting lab order for lipid and A1C.    - pended A1c and Lipid. Please review and sign if appropriate.    Ehsan Carson, BSN RN  [facility]

## 2022-11-16 NOTE — TELEPHONE ENCOUNTER
Patient called back to check on the status of this request. He would like to know if provider could send in a RX to pharmacy or if he needs to be seen, he would like to see provider before provider goes on vacation. Please call patient to advise.

## 2022-11-17 NOTE — TELEPHONE ENCOUNTER
If he would like to come in for labs tomorrow morning without a visit we can arrange that?  And then follow-up in the clinic visit in the next 10 days if that works thank you

## 2022-11-17 NOTE — TELEPHONE ENCOUNTER
Pt calling back, asking for appt with PCP or call regarding anxiety. Also about Labs, would like lipid and A1c. Please call back today if possible, Pt will call back around 10 am to f/u.

## 2022-11-17 NOTE — TELEPHONE ENCOUNTER
Patient called back. Message was relayed but he is asking about the anxiety medication?  Patient would like to come in tomorrow for labs. Please put in orders. Patient is also requesting if provider can send in a RX to pharmacy and he will make a follow up appt after provider returns from vacation to discuss lab results. Please call patient to advise.

## 2022-11-18 NOTE — TELEPHONE ENCOUNTER
Patient calls back inquiring about status of anxiety medication request. Writer repeated Dr. Nava's message below. Patient states provider's message still does not answer his medication request. Writer ask patient if Dr. Nava has ever prescribe him anxiety medication. Patient says no. Writer reassured patient that Dr. Nava wants to see him in the next 10 days to address as stated in provider's message. Patient needs to be seen before provider can prescribe medication. Patient verbalizes understanding and schedule OFV on 12/07 with Dr. Nava. However, he is requesting to be seen sooner.     Dr. Nava, are you able to double him on 11/25? Please advise.

## 2022-11-22 ENCOUNTER — LAB (OUTPATIENT)
Dept: LAB | Facility: CLINIC | Age: 67
End: 2022-11-22
Payer: COMMERCIAL

## 2022-11-22 DIAGNOSIS — E78.01 FAMILIAL HYPERCHOLESTEROLEMIA: ICD-10-CM

## 2022-11-22 DIAGNOSIS — E11.9 TYPE 2 DIABETES MELLITUS WITHOUT COMPLICATION (H): Primary | ICD-10-CM

## 2022-11-22 DIAGNOSIS — Z11.59 NEED FOR HEPATITIS C SCREENING TEST: ICD-10-CM

## 2022-11-22 DIAGNOSIS — E11.9 TYPE 2 DIABETES MELLITUS WITHOUT COMPLICATION, WITHOUT LONG-TERM CURRENT USE OF INSULIN (H): ICD-10-CM

## 2022-11-22 LAB
CREAT UR-MCNC: 96.5 MG/DL
HBA1C MFR BLD: 6.1 % (ref 0–5.6)
HCV AB SERPL QL IA: NONREACTIVE
MICROALBUMIN UR-MCNC: <12 MG/L
MICROALBUMIN/CREAT UR: NORMAL MG/G{CREAT}

## 2022-11-22 PROCEDURE — 36415 COLL VENOUS BLD VENIPUNCTURE: CPT

## 2022-11-22 PROCEDURE — 86803 HEPATITIS C AB TEST: CPT

## 2022-11-22 PROCEDURE — 80048 BASIC METABOLIC PNL TOTAL CA: CPT

## 2022-11-22 PROCEDURE — 83036 HEMOGLOBIN GLYCOSYLATED A1C: CPT

## 2022-11-22 PROCEDURE — 80061 LIPID PANEL: CPT

## 2022-11-22 PROCEDURE — 82043 UR ALBUMIN QUANTITATIVE: CPT

## 2022-11-23 LAB
ANION GAP SERPL CALCULATED.3IONS-SCNC: 15 MMOL/L (ref 7–15)
BUN SERPL-MCNC: 18.6 MG/DL (ref 8–23)
CALCIUM SERPL-MCNC: 9.6 MG/DL (ref 8.8–10.2)
CHLORIDE SERPL-SCNC: 107 MMOL/L (ref 98–107)
CHOLEST SERPL-MCNC: 105 MG/DL
CREAT SERPL-MCNC: 1.08 MG/DL (ref 0.67–1.17)
DEPRECATED HCO3 PLAS-SCNC: 20 MMOL/L (ref 22–29)
GFR SERPL CREATININE-BSD FRML MDRD: 74 ML/MIN/1.73M2
GLUCOSE SERPL-MCNC: 113 MG/DL (ref 70–99)
HDLC SERPL-MCNC: 46 MG/DL
LDLC SERPL CALC-MCNC: 47 MG/DL
NONHDLC SERPL-MCNC: 59 MG/DL
POTASSIUM SERPL-SCNC: 4.7 MMOL/L (ref 3.4–5.3)
SODIUM SERPL-SCNC: 142 MMOL/L (ref 136–145)
TRIGL SERPL-MCNC: 62 MG/DL

## 2022-12-07 ENCOUNTER — OFFICE VISIT (OUTPATIENT)
Dept: FAMILY MEDICINE | Facility: CLINIC | Age: 67
End: 2022-12-07
Payer: COMMERCIAL

## 2022-12-07 VITALS
OXYGEN SATURATION: 97 % | SYSTOLIC BLOOD PRESSURE: 124 MMHG | HEIGHT: 65 IN | BODY MASS INDEX: 26.16 KG/M2 | RESPIRATION RATE: 12 BRPM | TEMPERATURE: 98.1 F | WEIGHT: 157 LBS | DIASTOLIC BLOOD PRESSURE: 60 MMHG | HEART RATE: 65 BPM

## 2022-12-07 DIAGNOSIS — M79.672 LEFT FOOT PAIN: ICD-10-CM

## 2022-12-07 DIAGNOSIS — Z23 NEED FOR VACCINATION: Primary | ICD-10-CM

## 2022-12-07 DIAGNOSIS — E11.9 TYPE 2 DIABETES MELLITUS WITHOUT COMPLICATION, WITHOUT LONG-TERM CURRENT USE OF INSULIN (H): ICD-10-CM

## 2022-12-07 DIAGNOSIS — F41.9 ANXIETY DISORDER, UNSPECIFIED TYPE: ICD-10-CM

## 2022-12-07 DIAGNOSIS — E78.00 HYPERCHOLESTEREMIA: ICD-10-CM

## 2022-12-07 PROCEDURE — 99214 OFFICE O/P EST MOD 30 MIN: CPT | Performed by: FAMILY MEDICINE

## 2022-12-07 RX ORDER — ESCITALOPRAM OXALATE 10 MG/1
10 TABLET ORAL DAILY
Qty: 30 TABLET | Refills: 4 | Status: SHIPPED | OUTPATIENT
Start: 2022-12-07 | End: 2023-01-17

## 2022-12-07 ASSESSMENT — PATIENT HEALTH QUESTIONNAIRE - PHQ9
5. POOR APPETITE OR OVEREATING: NEARLY EVERY DAY
SUM OF ALL RESPONSES TO PHQ QUESTIONS 1-9: 13

## 2022-12-07 ASSESSMENT — ANXIETY QUESTIONNAIRES
5. BEING SO RESTLESS THAT IT IS HARD TO SIT STILL: NEARLY EVERY DAY
2. NOT BEING ABLE TO STOP OR CONTROL WORRYING: NEARLY EVERY DAY
GAD7 TOTAL SCORE: 19
3. WORRYING TOO MUCH ABOUT DIFFERENT THINGS: NEARLY EVERY DAY
7. FEELING AFRAID AS IF SOMETHING AWFUL MIGHT HAPPEN: MORE THAN HALF THE DAYS
IF YOU CHECKED OFF ANY PROBLEMS ON THIS QUESTIONNAIRE, HOW DIFFICULT HAVE THESE PROBLEMS MADE IT FOR YOU TO DO YOUR WORK, TAKE CARE OF THINGS AT HOME, OR GET ALONG WITH OTHER PEOPLE: SOMEWHAT DIFFICULT
6. BECOMING EASILY ANNOYED OR IRRITABLE: NEARLY EVERY DAY
GAD7 TOTAL SCORE: 19
1. FEELING NERVOUS, ANXIOUS, OR ON EDGE: MORE THAN HALF THE DAYS

## 2022-12-07 ASSESSMENT — ENCOUNTER SYMPTOMS: NERVOUS/ANXIOUS: 1

## 2022-12-07 NOTE — PROGRESS NOTES
"  Assessment & Plan     Need for vaccination  Patient will receive vaccination at his next visit    Hypercholesteremia    Cholesterol panel discussed with the patient at the test done in November.  6 months    Type 2 diabetes mellitus without complication, without long-term current use of insulin (H)    A1c is slightly elevated from his prior visit his blood sugars are risen they are now in the 1 15-1 20 range in the morning.  He continues to watch his diet he is exercising regularly he has not been drinking more soda    Anxiety disorder, unspecified type    Discussed this in length.  He has had several family deaths in the last 6 to 9 months.  He lost his dog.  He has been having difficulty sleeping.  He notes that he gets more irate and snaps at people.  This is caused him to be worried he feels that he may be suffering from increased anxiety I have placed him on Lexapro side effects of medication discussed in detail I would also like him to see a therapist.  - escitalopram (LEXAPRO) 10 MG tablet; Take 1 tablet (10 mg) by mouth daily  - Adult Mental Health  Referral; Future    Left foot pain    Nonspecific pain noted on the dorsal aspect of the left foot reproduced with any walking he feels a sensation in the bottom of the left foot but no swelling is been noted has been present for several months he is changed footwear would like him to see podiatry  - Orthopedic  Referral; Future      BMI:   Estimated body mass index is 25.78 kg/m  as calculated from the following:    Height as of this encounter: 1.662 m (5' 5.43\").    Weight as of this encounter: 71.2 kg (157 lb).       Depression Screening Follow Up    PHQ 12/7/2022   PHQ-9 Total Score 13   Q9: Thoughts of better off dead/self-harm past 2 weeks More than half the days                 Follow Up    Follow Up Actions Taken  Therapist visit medications started      Return in about 3 months (around 3/7/2023).    Helio Nava MD  [facility] " "[facility]    Alon Mosley is a 67 year old, presenting for the following health issues:  Anxiety and Foot Pain (Left foot painful)    67-year-old male here for follow-up.  He has type 2 diabetes hypertension hyperlipidemia and coronary artery disease.  He reports his blood sugars have been slightly higher, he reports he has been having a slightly difficult time sleeping and he has been reading more anxious.  He reports that he sometimes will \"\" snap at people including his wife.  He says that this year has been very difficult for him he is lost several family members.  He had a brother and sister passed away his uncle and aunt passed away recently.  He says he worries about things that are not under control and is getting irritated.    He thinks his blood sugars are higher because of this.  He also notes that sometimes his blood pressure goes elevated when getting anxious when he went to the dentist this occurred  He has noticed a sensation on the bottom of his left foot that feels like there is a liquid there.  It disturbs when he tries to walk but it does not cause over pain no numbness noted in the left foot.  He changed his footwear and that did not change or improve the situation.  He has had the sensation for at least 2 months  Anxiety               Review of Systems   Psychiatric/Behavioral: The patient is nervous/anxious.    Musculoskeletal left foot discomfort as mentioned in HPI  10 point review of all other systems negative        Objective    /60   Pulse 65   Temp 98.1  F (36.7  C) (Oral)   Resp 12   Ht 1.662 m (5' 5.43\")   Wt 71.2 kg (157 lb)   SpO2 97%   BMI 25.78 kg/m    Body mass index is 25.78 kg/m .  Physical Exam   GENERAL: healthy, alert and no distress  EYES: Eyes grossly normal to inspection, PERRL and conjunctivae and sclerae normal  HENT: ear canals and TM's normal, nose and mouth without ulcers or lesions  NECK: no adenopathy, no asymmetry, masses, or scars and " thyroid normal to palpation  RESP: lungs clear to auscultation - no rales, rhonchi or wheezes  CV: regular rate and rhythm, normal S1 S2, no S3 or S4, no murmur, click or rub, no peripheral edema and peripheral pulses strong  ABDOMEN: soft, nontender, no hepatosplenomegaly, no masses and bowel sounds normal  MS: no gross musculoskeletal defects noted, no edema  SKIN: no suspicious lesions or rashes  NEURO: Normal strength and tone, mentation intact and speech normal  PSYCH: mentation appears normal, affect normal/bright  Diabetic foot exam: normal DP and PT pulses, no trophic changes or ulcerative lesions and normal sensory exam

## 2022-12-26 ENCOUNTER — OFFICE VISIT (OUTPATIENT)
Dept: PODIATRY | Facility: CLINIC | Age: 67
End: 2022-12-26
Payer: COMMERCIAL

## 2022-12-26 VITALS — WEIGHT: 157 LBS | OXYGEN SATURATION: 97 % | HEART RATE: 62 BPM | BODY MASS INDEX: 26.16 KG/M2 | HEIGHT: 65 IN

## 2022-12-26 DIAGNOSIS — E11.9 TYPE 2 DIABETES MELLITUS WITHOUT COMPLICATION, UNSPECIFIED WHETHER LONG TERM INSULIN USE (H): Primary | ICD-10-CM

## 2022-12-26 DIAGNOSIS — M79.672 LEFT FOOT PAIN: ICD-10-CM

## 2022-12-26 PROCEDURE — 99203 OFFICE O/P NEW LOW 30 MIN: CPT | Performed by: PODIATRIST

## 2022-12-26 ASSESSMENT — PAIN SCALES - GENERAL: PAINLEVEL: NO PAIN (0)

## 2022-12-26 NOTE — LETTER
12/26/2022         RE: Dimitri Almaraz  [address]  [place] [address]        Dear Colleague,    Thank you for referring your patient, Dimitri Almaraz, to the [facility]. Please see a copy of my visit note below.          FOOT AND ANKLE SURGERY/PODIATRY CONSULT NOTE         ASSESSMENT:   Left foot Pain  DM2      TREATMENT:  -There is no pain to palpation MPJ's 1-5 left foot, 2nd/3rd IMS left or along central metatarsals left foot. Full ROM MPJ's left foot.     -I discussed with the patient that there is no evidence of trauma, infection or pain on exam today. He is walking without discomfort and does not have significant symptoms at this time.    -We discussed getting an MRI for further evaluation if symptoms do not improve or worsen. He will continue to monitor and contact my office should this occur.     -Patient's questions invited and answered. He was encouraged to call my office with any further questions or concerns.     Gokul Duenas DPM  [facility] Podiatry/Foot & Ankle Surgery      HPI: I was asked to see Dimitri Almaraz today for left foot pain. The patient states he first noticed some discomfort in the left forefoot 2-3 months ago, denies trauma. He does not have pain at this time and denies any form of treatment other than trying different boots and insert which seemed to help.       No past medical history on file.      Social History     Socioeconomic History     Marital status:      Spouse name: Not on file     Number of children: Not on file     Years of education: Not on file     Highest education level: Not on file   Occupational History     Not on file   Tobacco Use     Smoking status: Former     Packs/day: 0.25     Years: 40.00     Pack years: 10.00     Types: Cigarettes     Quit date: 5/10/2019     Years since quitting: 3.6     Smokeless tobacco: Never     Tobacco comments:     1 pack per week   Substance and Sexual Activity     Alcohol  "use: Yes     Alcohol/week: 0.0 standard drinks     Comment: Alcoholic Drinks/day: \"some...... weekends\"     Drug use: No     Sexual activity: Yes     Partners: Female   Other Topics Concern     Not on file   Social History Narrative    He retired in June 2019. He is very active, and likes to go biking for exercise.     Social Determinants of Health     Financial Resource Strain: Not on file   Food Insecurity: Not on file   Transportation Needs: Not on file   Physical Activity: Not on file   Stress: Not on file   Social Connections: Not on file   Intimate Partner Violence: Not on file   Housing Stability: Not on file          No Known Allergies      MEDICATIONS:   Current Outpatient Medications   Medication     atorvastatin (LIPITOR) 40 MG tablet     blood glucose (ACCU-CHEK GUIDE) test strip     blood glucose (NO BRAND SPECIFIED) lancets standard     blood glucose (NO BRAND SPECIFIED) lancing device     blood glucose (NO BRAND SPECIFIED) test strip     blood glucose monitoring (SOFTCLIX) lancets     escitalopram (LEXAPRO) 10 MG tablet     metFORMIN (GLUCOPHAGE) 500 MG tablet     No current facility-administered medications for this visit.        Family History   Problem Relation Age of Onset     Heart Disease Mother      Heart Disease Father      Heart Disease Sister      Heart Disease Brother      Heart Disease Sister      Diabetes Sister      Diabetes Brother      Diabetes Brother      Diabetes Brother      Heart Disease Brother      Diabetes Brother      Diabetes Brother      Diabetes Brother           Review of Systems - 10 point Review of Systems is negative except for left foot pain which is noted in HPI.    OBJECTIVE:  Appearance: alert, well appearing, and in no distress.    VITAL SIGNS: Pulse 62   Ht 1.662 m (5' 5.43\")   Wt 71.2 kg (157 lb)   SpO2 97%   BMI 25.78 kg/m        General appearance: Patient is alert and fully cooperative with history & exam.  No sign of distress is noted during the visit.   "   Psychiatric: Affect is pleasant & appropriate.  Patient appears motivated to improve health.     Respiratory: Breathing is regular & unlabored while sitting.     HEENT: Hearing is intact to spoken word.  Speech is clear.  No gross evidence of visual impairment that would impact ambulation.      Vascular: Dorsalis pedis palpable. There is pedal hair growth left.  CFT < 3 sec from anterior tibial surface to distal digits left. There is no appreciable edema noted.  Dermatologic: Turgor and texture are within normal limits. No coloration or temperature changes. No primary or secondary lesions noted.  Neurologic: All epicritic and proprioceptive sensations are grossly intact left.  Musculoskeletal: No pain to palpation MPJ's 1-5 left foot, 2nd/3rd IMS left or along central metatarsals left foot. Full ROM MPJ's left foot.           Again, thank you for allowing me to participate in the care of your patient.        Sincerely,        Gokul Duenas DPM

## 2022-12-26 NOTE — PROGRESS NOTES
"      FOOT AND ANKLE SURGERY/PODIATRY CONSULT NOTE         ASSESSMENT:   Left foot Pain  DM2      TREATMENT:  -There is no pain to palpation MPJ's 1-5 left foot, 2nd/3rd IMS left or along central metatarsals left foot. Full ROM MPJ's left foot.     -I discussed with the patient that there is no evidence of trauma, infection or pain on exam today. He is walking without discomfort and does not have significant symptoms at this time.    -We discussed getting an MRI for further evaluation if symptoms do not improve or worsen. He will continue to monitor and contact my office should this occur.     -Patient's questions invited and answered. He was encouraged to call my office with any further questions or concerns.     Gokul Duenas DPM  [facility] Podiatry/Foot & Ankle Surgery      HPI: I was asked to see Dimitri Almaraz today for left foot pain. The patient states he first noticed some discomfort in the left forefoot 2-3 months ago, denies trauma. He does not have pain at this time and denies any form of treatment other than trying different boots and insert which seemed to help.       No past medical history on file.      Social History     Socioeconomic History     Marital status:      Spouse name: Not on file     Number of children: Not on file     Years of education: Not on file     Highest education level: Not on file   Occupational History     Not on file   Tobacco Use     Smoking status: Former     Packs/day: 0.25     Years: 40.00     Pack years: 10.00     Types: Cigarettes     Quit date: 5/10/2019     Years since quitting: 3.6     Smokeless tobacco: Never     Tobacco comments:     1 pack per week   Substance and Sexual Activity     Alcohol use: Yes     Alcohol/week: 0.0 standard drinks     Comment: Alcoholic Drinks/day: \"some...... weekends\"     Drug use: No     Sexual activity: Yes     Partners: Female   Other Topics Concern     Not on file   Social History Narrative    He retired in June " "2019. He is very active, and likes to go biking for exercise.     Social Determinants of Health     Financial Resource Strain: Not on file   Food Insecurity: Not on file   Transportation Needs: Not on file   Physical Activity: Not on file   Stress: Not on file   Social Connections: Not on file   Intimate Partner Violence: Not on file   Housing Stability: Not on file          No Known Allergies      MEDICATIONS:   Current Outpatient Medications   Medication     atorvastatin (LIPITOR) 40 MG tablet     blood glucose (ACCU-CHEK GUIDE) test strip     blood glucose (NO BRAND SPECIFIED) lancets standard     blood glucose (NO BRAND SPECIFIED) lancing device     blood glucose (NO BRAND SPECIFIED) test strip     blood glucose monitoring (SOFTCLIX) lancets     escitalopram (LEXAPRO) 10 MG tablet     metFORMIN (GLUCOPHAGE) 500 MG tablet     No current facility-administered medications for this visit.        Family History   Problem Relation Age of Onset     Heart Disease Mother      Heart Disease Father      Heart Disease Sister      Heart Disease Brother      Heart Disease Sister      Diabetes Sister      Diabetes Brother      Diabetes Brother      Diabetes Brother      Heart Disease Brother      Diabetes Brother      Diabetes Brother      Diabetes Brother           Review of Systems - 10 point Review of Systems is negative except for left foot pain which is noted in HPI.    OBJECTIVE:  Appearance: alert, well appearing, and in no distress.    VITAL SIGNS: Pulse 62   Ht 1.662 m (5' 5.43\")   Wt 71.2 kg (157 lb)   SpO2 97%   BMI 25.78 kg/m        General appearance: Patient is alert and fully cooperative with history & exam.  No sign of distress is noted during the visit.     Psychiatric: Affect is pleasant & appropriate.  Patient appears motivated to improve health.     Respiratory: Breathing is regular & unlabored while sitting.     HEENT: Hearing is intact to spoken word.  Speech is clear.  No gross evidence of visual " impairment that would impact ambulation.      Vascular: Dorsalis pedis palpable. There is pedal hair growth left.  CFT < 3 sec from anterior tibial surface to distal digits left. There is no appreciable edema noted.  Dermatologic: Turgor and texture are within normal limits. No coloration or temperature changes. No primary or secondary lesions noted.  Neurologic: All epicritic and proprioceptive sensations are grossly intact left.  Musculoskeletal: No pain to palpation MPJ's 1-5 left foot, 2nd/3rd IMS left or along central metatarsals left foot. Full ROM MPJ's left foot.

## 2022-12-28 ENCOUNTER — IMMUNIZATION (OUTPATIENT)
Dept: FAMILY MEDICINE | Facility: CLINIC | Age: 67
End: 2022-12-28
Payer: COMMERCIAL

## 2022-12-28 PROCEDURE — 0134A COVID-19 VACCINE BIVALENT BOOSTER 18+ (MODERNA): CPT

## 2022-12-28 PROCEDURE — 91313 COVID-19 VACCINE BIVALENT BOOSTER 18+ (MODERNA): CPT

## 2023-01-04 DIAGNOSIS — E11.9 TYPE 2 DIABETES MELLITUS WITHOUT COMPLICATION, WITHOUT LONG-TERM CURRENT USE OF INSULIN (H): ICD-10-CM

## 2023-01-05 RX ORDER — BLOOD SUGAR DIAGNOSTIC
STRIP MISCELLANEOUS
Qty: 100 STRIP | Refills: 1 | Status: SHIPPED | OUTPATIENT
Start: 2023-01-05 | End: 2023-03-14

## 2023-01-05 NOTE — TELEPHONE ENCOUNTER
"Last Written Prescription Date:  11/1/21  Last Fill Quantity: 100,  # refills: 3   Last office visit provider:  12/7/22     Requested Prescriptions   Pending Prescriptions Disp Refills     blood glucose (ONETOUCH VERIO IQ) test strip [Pharmacy Med Name: OneTouch Verio In Vitro Strip]  0     Sig: USE TO TEST BLOOD SUGAR 1 TIME DAILY OR AS DIRECTED       Diabetic Supplies Protocol Passed - 1/5/2023 10:07 AM        Passed - Medication is active on med list        Passed - Patient is 18 years of age or older        Passed - Recent (6 mo) or future (30 days) visit within the authorizing provider's specialty     Patient had office visit in the last 6 months or has a visit in the next 30 days with authorizing provider.  See \"Patient Info\" tab in inbasket, or \"Choose Columns\" in Meds & Orders section of the refill encounter.                 Rakesh Palencia RN 01/05/23 10:07 AM  "

## 2023-01-17 ENCOUNTER — OFFICE VISIT (OUTPATIENT)
Dept: FAMILY MEDICINE | Facility: CLINIC | Age: 68
End: 2023-01-17
Payer: COMMERCIAL

## 2023-01-17 VITALS
TEMPERATURE: 97.7 F | RESPIRATION RATE: 16 BRPM | OXYGEN SATURATION: 97 % | HEART RATE: 60 BPM | WEIGHT: 157.5 LBS | SYSTOLIC BLOOD PRESSURE: 130 MMHG | HEIGHT: 65 IN | BODY MASS INDEX: 26.24 KG/M2 | DIASTOLIC BLOOD PRESSURE: 68 MMHG

## 2023-01-17 DIAGNOSIS — F32.A ANXIETY AND DEPRESSION: ICD-10-CM

## 2023-01-17 DIAGNOSIS — Z76.0 ENCOUNTER FOR MEDICATION REFILL: ICD-10-CM

## 2023-01-17 DIAGNOSIS — H93.13 TINNITUS, BILATERAL: ICD-10-CM

## 2023-01-17 DIAGNOSIS — F41.9 ANXIETY AND DEPRESSION: ICD-10-CM

## 2023-01-17 DIAGNOSIS — E11.9 TYPE 2 DIABETES MELLITUS WITHOUT COMPLICATION, UNSPECIFIED WHETHER LONG TERM INSULIN USE (H): Primary | ICD-10-CM

## 2023-01-17 DIAGNOSIS — E11.9 TYPE 2 DIABETES MELLITUS WITHOUT COMPLICATION, WITHOUT LONG-TERM CURRENT USE OF INSULIN (H): ICD-10-CM

## 2023-01-17 PROCEDURE — 99214 OFFICE O/P EST MOD 30 MIN: CPT | Performed by: FAMILY MEDICINE

## 2023-01-17 RX ORDER — ESCITALOPRAM OXALATE 10 MG/1
10 TABLET ORAL DAILY
Qty: 30 TABLET | Refills: 4 | Status: SHIPPED | OUTPATIENT
Start: 2023-01-17 | End: 2023-03-14

## 2023-01-17 NOTE — PROGRESS NOTES
"  Assessment & Plan     Type 2 diabetes mellitus without complication, unspecified whether long term insulin use (H)  Patient is started walking outside again.  Taking his medications faithfully.  Blood sugars range between 110-126  Encounter for medication refill    - metFORMIN (GLUCOPHAGE) 500 MG tablet; Take 1 tablet (500 mg) by mouth 2 times daily (with meals)    Type 2 diabetes mellitus without complication, without long-term current use of insulin (H)    - metFORMIN (GLUCOPHAGE) 500 MG tablet; Take 1 tablet (500 mg) by mouth 2 times daily (with meals)    Tinnitus, bilateral    Ringing in both ears has been present for several months its worse in the left ear orts present all the time patient reports that he does not know if this causes a headache no balance problems.  He is hearing was checked several years ago.  He has been exposed to noisy sounds all his life as he worked as a .  He needs to have an audiology exam and if those results proved to be equivocal he should see ENT  - Adult Audiology  Referral; Future    Anxiety and depression  Visit obtained with psychologist in March he finds a real improvement with the Lexapro he is less anxious not agitated he feels calm he does not \"\" get down on himself his wife is noticed improvement.  No side effects noted with medication  - escitalopram (LEXAPRO) 10 MG tablet; Take 1 tablet (10 mg) by mouth daily      BMI:   Estimated body mass index is 25.87 kg/m  as calculated from the following:    Height as of this encounter: 1.662 m (5' 5.43\").    Weight as of this encounter: 71.4 kg (157 lb 8 oz).           No follow-ups on file.    Helio Nava MD  [facility] MG Mosley is a 67 year old, presenting for the following health issues:  Ear Problem (Ringing in both ears. Mostly in Left ear )    History of presenting illness  67-year-old male here for follow-up.  He has had a ringing in his ear for several months but at " "his last visit he neglected to discuss that since he was more concerned about his anxiety and depression he has type diabetes which is well controlled he started walking outside again in an effort to lose weight.  He reports his blood sugars have been between about 108-130.  He says the ringing started 3 to 4 months ago and is primarily present in his left ear he thinks it is present all the time because here he can never hear the ringing in the morning when it is quiet does not disturb his sleep as he just sleeps with the television on no pain in the years possible headache associated with the ringing.  No dizziness no falls no episodes of nausea and reported.  History of Present Illness       Reason for visit:  Ear ringing  Symptom onset:  More than a month  Symptom intensity:  Moderate  Symptom progression:  Staying the same  Had these symptoms before:  No    He eats 4 or more servings of fruits and vegetables daily.He consumes 0 sweetened beverage(s) daily.He exercises with enough effort to increase his heart rate 30 to 60 minutes per day.  He exercises with enough effort to increase his heart rate 4 days per week.   He is taking medications regularly.             Review of Systems   Constitutional, HEENT, cardiovascular, pulmonary, gi and gu systems are negative, except as otherwise noted.      Objective    /68   Pulse 60   Temp 97.7  F (36.5  C) (Oral)   Resp 16   Ht 1.662 m (5' 5.43\")   Wt 71.4 kg (157 lb 8 oz)   SpO2 97%   BMI 25.87 kg/m    Body mass index is 25.87 kg/m .  Physical Exam   GENERAL: healthy, alert and no distress  EYES: Eyes grossly normal to inspection, PERRL and conjunctivae and sclerae normal  HENT: ear canals and TM's normal, nose and mouth without ulcers or lesions  RESP: lungs clear to auscultation - no rales, rhonchi or wheezes  CV: regular rate and rhythm, normal S1 S2, no S3 or S4, no murmur, click or rub, no peripheral edema and peripheral pulses strong  MS: no gross " musculoskeletal defects noted, no edema  SKIN: no suspicious lesions or rashes  NEURO: Normal strength and tone, mentation intact and speech normal  PSYCH: mentation appears normal, affect normal/bright

## 2023-01-25 ENCOUNTER — OFFICE VISIT (OUTPATIENT)
Dept: AUDIOLOGY | Facility: CLINIC | Age: 68
End: 2023-01-25
Attending: FAMILY MEDICINE
Payer: COMMERCIAL

## 2023-01-25 DIAGNOSIS — H93.13 TINNITUS, BILATERAL: ICD-10-CM

## 2023-01-25 DIAGNOSIS — H90.3 SENSORINEURAL HEARING LOSS, ASYMMETRICAL: Primary | ICD-10-CM

## 2023-01-25 PROCEDURE — 92565 STENGER TEST PURE TONE: CPT | Performed by: AUDIOLOGIST

## 2023-01-25 PROCEDURE — 92550 TYMPANOMETRY & REFLEX THRESH: CPT | Performed by: AUDIOLOGIST

## 2023-01-25 PROCEDURE — 92557 COMPREHENSIVE HEARING TEST: CPT | Performed by: AUDIOLOGIST

## 2023-01-25 NOTE — PROGRESS NOTES
AUDIOLOGY REPORT    SUBJECTIVE:  Dimitri Almaraz is a 67 year old male who was seen in the Audiology Clinic at the [facility] for audiologic evaluation, referred by Helio Nava M.D.. The patient reports constant bilateral tinnitus that is stronger in the left ear, he reports that it began about 3-4 months ago. He denies any sickness, head trauma, or medication change around that time.  He reports occupational noise exposure after working as a , he notes he did utilize hearing protection. The patient denies  bilateral otalgia, bilateral drainage, bilateral aural fullness and dizziness. NO history of ear surgeries reported.  The patient notes difficulty with communication in a variety of listening situations.     OBJECTIVE:  Fall Risk Screen:  1. Have you fallen two or more times in the past year? No  2. Have you fallen and had an injury in the past year? No    Timed Up and Go Score (in seconds): not tested  Is patient a fall risk? No  Referral initiated: No  Fall Risk Assessment Completed by Audiology    Otoscopic exam indicates ears are clear of cerumen bilaterally     Pure Tone Thresholds assessed using conventional audiometry with fair to good  reliability from 250-8000 Hz bilaterally using insert earphones and circumaural headphones     RIGHT:  normal sloping to moderate sensorineural hearing loss    LEFT:    normal sloping to moderate sensorineural hearing loss    Significant ear difference noted 2-4 kHz with the left ear worse than the right ear.    Tympanogram:    RIGHT: Hypermobile    LEFT:   normal eardrum mobility    Reflexes (reported by stimulus ear):  RIGHT: Ipsilateral is present at normal levels  RIGHT: Contralateral is present at normal levels  LEFT:   Ipsilateral is present at normal levels  LEFT:   Contralateral is present at normal levels    Speech Reception Threshold:    RIGHT: 10 dB HL    LEFT:   5 dB HL    Word Recognition Score:      RIGHT: 100% at 55 dB HL using NU-6 recorded word list.    LEFT:   100% at 55 dB HL using NU-6 recorded word list.    Patient was counseled regarding tinnitus. It was reviewed that there is a correlation between tinnitus and hearing loss. Strategies for dealing with tinnitus were reviewed as well as potential options for management including maskers, health psychology, or tinnitus retraining therapy. He expressed that he was not interested in those options. He did report that he drinks a lot of coffee (roughly a pot of coffee per day) and so may consider cutting back his caffeine intake to see if it helps.    It was also discussed that the patient should follow-up with ENT regarding the significant ear difference. He expressed understanding and was agreeable to make an appointment with ENT.      ASSESSMENT:     ICD-10-CM    1. Tinnitus, bilateral  H93.13 Adult Audiology [facility] Referral           Today's results revealed a bilateral asymmetric sensorineural hearing loss. Today s results were discussed with the patient in detail.     PLAN:  Patient was counseled regarding hearing loss and impact on communication.  Patient is a good candidate for amplification in the left ear at this time. The patient is not interested in a trial with a left hearing aid at this time. It is recommended that the patient follow-up with ENT regarding the significant ear difference, the appointment was scheduled prior to him leaving the clinic today.  Please call this clinic with questions regarding these results or recommendations.        Sumaya Mancia  Audiologist  [place] License  #[id number]

## 2023-02-14 ENCOUNTER — OFFICE VISIT (OUTPATIENT)
Dept: OTOLARYNGOLOGY | Facility: CLINIC | Age: 68
End: 2023-02-14
Payer: COMMERCIAL

## 2023-02-14 VITALS
OXYGEN SATURATION: 96 % | WEIGHT: 163.8 LBS | SYSTOLIC BLOOD PRESSURE: 131 MMHG | BODY MASS INDEX: 26.33 KG/M2 | HEIGHT: 66 IN | DIASTOLIC BLOOD PRESSURE: 72 MMHG | HEART RATE: 62 BPM

## 2023-02-14 DIAGNOSIS — H90.3 BILATERAL SENSORINEURAL HEARING LOSS: Primary | ICD-10-CM

## 2023-02-14 DIAGNOSIS — H90.3 ASYMMETRICAL SENSORINEURAL HEARING LOSS: ICD-10-CM

## 2023-02-14 PROCEDURE — 99203 OFFICE O/P NEW LOW 30 MIN: CPT | Performed by: REGISTERED NURSE

## 2023-02-14 ASSESSMENT — PAIN SCALES - GENERAL: PAINLEVEL: NO PAIN (0)

## 2023-02-14 NOTE — PROGRESS NOTES
Otolaryngology Clinic  February 14, 2023    Chief Complaint:   Hearing loss and tinnitus       History of Present Illness:   Dimitri Almaraz is a 67 year old male who presents today for evaluation of asymmetric hearing loss and bilateral tinnitus. Patient reports a 5 month history of bilateral tinnitus with left worse than right. Symptoms occurred suddenly. Patient denies any otalgia, otorrhea, dizziness, facial numbness/weakness, history of frequent ear infections, or ear surgeries. No recent changes in medications. History of occupational noise exposure. Patient listens to TV at night to help treat the tinnitus. Denies any significant new stress, anxiety or changes in sleep patterns.    Past Medical History:  No past medical history on file.    Past Surgical History:  Past Surgical History:   Procedure Laterality Date     HC REMOVE TONSILS/ADENOIDS,<11 Y/O      Description: Tonsillectomy With Adenoidectomy;  Recorded: 01/29/2009;     HC REPAIR OF NASAL SEPTUM      Description: Septoplasty;  Recorded: 01/29/2009;     Medications:  Current Outpatient Medications   Medication Sig Dispense Refill     atorvastatin (LIPITOR) 40 MG tablet Take 1 tablet (40 mg) by mouth daily 90 tablet 3     blood glucose (ACCU-CHEK GUIDE) test strip Use to test blood sugar one time daily or as directed. 100 strip 3     blood glucose (NO BRAND SPECIFIED) lancets standard Use to test blood sugar 1 times daily or as directed. 100 each 3     blood glucose (NO BRAND SPECIFIED) lancing device Device to be used with lancets. 1 each 0     blood glucose (ONETOUCH VERIO IQ) test strip USE TO TEST BLOOD SUGAR 1 TIME DAILY OR AS DIRECTED 100 strip 1     blood glucose monitoring (SOFTCLIX) lancets 1 each daily Use to test blood sugar one time daily or as directed. 100 each 3     escitalopram (LEXAPRO) 10 MG tablet Take 1 tablet (10 mg) by mouth daily 30 tablet 4     metFORMIN (GLUCOPHAGE) 500 MG tablet Take 1 tablet (500 mg) by mouth 2 times  "daily (with meals) 180 tablet 3     Allergies:  No Known Allergies     Social History:  Social History     Tobacco Use     Smoking status: Former     Packs/day: 0.25     Years: 40.00     Pack years: 10.00     Types: Cigarettes     Quit date: 5/10/2019     Years since quitting: 3.7     Smokeless tobacco: Never     Tobacco comments:     1 pack per week   Substance Use Topics     Alcohol use: Yes     Alcohol/week: 0.0 standard drinks     Comment: Alcoholic Drinks/day: \"some...... weekends\"     Drug use: No       ROS: 10 point ROS neg other than the symptoms noted above in the HPI.    Physical Exam:    There were no vitals taken for this visit.     Constitutional:  The patient was unaccompanied, well-groomed, and in no acute distress.     Neurologic: Alert and oriented x 3.  CN's III-XII within normal limits.  Voice normal.   Psychiatric: The patient's affect was calm, cooperative, and appropriate.    Communication:  Normal; communicates verbally, normal voice quality.    Respiratory: Breathing comfortably without stridor or exertion of accessory muscles.    Ears: Pinnae and tragus non-tender.  EAC's and TM's were clear.       Audiogram: 1/25/2023- data independently reviewed  Right ear: Normal sloping to moderate SNHL  Left ear: Normal sloping to moderate SNHL (10-25 dB asymmetry from 3859-5183 Hz with left worse than right.)   % at 55 dB  Acoustic Reflexes: present in all conditions.  Tympanograms: type A bilaterally            Assessment and Plan:  1. Bilateral tinnitus  Patient with new onset of bilateral tinnitus. Reviewed audiogram with patient which shows normal to moderate SNHL with asymmetry of left worse than right from 6360-5480 Hz. Explained to patient that tinnitus is a central process but is closely correlated to hearing loss. Patient's tinnitus symptoms may be related to hearing loss. Reviewed other factors that can contribute to tinnitus including stress, anxiety, lack of sleep, and neck/muscle " tension. Suspect tinnitus is due to multiple factors including hearing loss, neck/jaw tension, and/or chronic health conditions such as diabetes.     Discussed management strategies with patient including tinnitus masking and cognitive behavioral approaches. Patient could also consider a hearing aid trial with masking features for the left ear. Patient would like to monitor at this time and will contact clinic if he would like to proceed with scheduling this consult.    2. Asymmetrical sensorineural hearing loss  Asymmetric hearing loss with left tinnitus louder than right. Discussed obtaining a MRI to rule out a retrocochlear lesion on the left side. Patient would like to monitor at this time. Recommend returning to clinic in 1 year to recheck hearing.      Patient will follow up in 1 year with audiogram. Sooner for any new changes in hearing.    Anitra Corey DNP, APRN, CNP  Otolaryngology  Head & Neck Surgery  [phone]    30 minutes spent on the date of the encounter doing chart review, history and exam, documentation and further activities per the note

## 2023-02-14 NOTE — LETTER
2/14/2023       RE: Dimitri Almaraz  [address]  [place] [address]     Dear Colleague,    Thank you for referring your patient, Dimitri Almaraz, to the [facility] EAR NOSE AND THROAT CLINIC [place] at [facility]. Please see a copy of my visit note below.      Otolaryngology Clinic  February 14, 2023    Chief Complaint:   Hearing loss and tinnitus       History of Present Illness:   Dimitri Almaraz is a 67 year old male who presents today for evaluation of asymmetric hearing loss and bilateral tinnitus. Patient reports a 5 month history of bilateral tinnitus with left worse than right. Symptoms occurred suddenly. Patient denies any otalgia, otorrhea, dizziness, facial numbness/weakness, history of frequent ear infections, or ear surgeries. No recent changes in medications. History of occupational noise exposure. Patient listens to TV at night to help treat the tinnitus. Denies any significant new stress, anxiety or changes in sleep patterns.    Past Medical History:  No past medical history on file.    Past Surgical History:  Past Surgical History:   Procedure Laterality Date     HC REMOVE TONSILS/ADENOIDS,<13 Y/O      Description: Tonsillectomy With Adenoidectomy;  Recorded: 01/29/2009;     HC REPAIR OF NASAL SEPTUM      Description: Septoplasty;  Recorded: 01/29/2009;     Medications:  Current Outpatient Medications   Medication Sig Dispense Refill     atorvastatin (LIPITOR) 40 MG tablet Take 1 tablet (40 mg) by mouth daily 90 tablet 3     blood glucose (ACCU-CHEK GUIDE) test strip Use to test blood sugar one time daily or as directed. 100 strip 3     blood glucose (NO BRAND SPECIFIED) lancets standard Use to test blood sugar 1 times daily or as directed. 100 each 3     blood glucose (NO BRAND SPECIFIED) lancing device Device to be used with lancets. 1 each 0     blood glucose (ONETOUCH VERIO IQ) test strip USE TO TEST BLOOD SUGAR 1 TIME  "DAILY OR AS DIRECTED 100 strip 1     blood glucose monitoring (SOFTCLIX) lancets 1 each daily Use to test blood sugar one time daily or as directed. 100 each 3     escitalopram (LEXAPRO) 10 MG tablet Take 1 tablet (10 mg) by mouth daily 30 tablet 4     metFORMIN (GLUCOPHAGE) 500 MG tablet Take 1 tablet (500 mg) by mouth 2 times daily (with meals) 180 tablet 3     Allergies:  No Known Allergies     Social History:  Social History     Tobacco Use     Smoking status: Former     Packs/day: 0.25     Years: 40.00     Pack years: 10.00     Types: Cigarettes     Quit date: 5/10/2019     Years since quitting: 3.7     Smokeless tobacco: Never     Tobacco comments:     1 pack per week   Substance Use Topics     Alcohol use: Yes     Alcohol/week: 0.0 standard drinks     Comment: Alcoholic Drinks/day: \"some...... weekends\"     Drug use: No       ROS: 10 point ROS neg other than the symptoms noted above in the HPI.    Physical Exam:    There were no vitals taken for this visit.     Constitutional:  The patient was unaccompanied, well-groomed, and in no acute distress.     Neurologic: Alert and oriented x 3.  CN's III-XII within normal limits.  Voice normal.   Psychiatric: The patient's affect was calm, cooperative, and appropriate.    Communication:  Normal; communicates verbally, normal voice quality.    Respiratory: Breathing comfortably without stridor or exertion of accessory muscles.    Ears: Pinnae and tragus non-tender.  EAC's and TM's were clear.       Audiogram: 1/25/2023- data independently reviewed  Right ear: Normal sloping to moderate SNHL  Left ear: Normal sloping to moderate SNHL (10-25 dB asymmetry from 5973-4731 Hz with left worse than right.)   % at 55 dB  Acoustic Reflexes: present in all conditions.  Tympanograms: type A bilaterally            Assessment and Plan:  1. Bilateral tinnitus  Patient with new onset of bilateral tinnitus. Reviewed audiogram with patient which shows normal to moderate SNHL with " asymmetry of left worse than right from 7996-0705 Hz. Explained to patient that tinnitus is a central process but is closely correlated to hearing loss. Patient's tinnitus symptoms may be related to hearing loss. Reviewed other factors that can contribute to tinnitus including stress, anxiety, lack of sleep, and neck/muscle tension. Suspect tinnitus is due to multiple factors including hearing loss, neck/jaw tension, and/or chronic health conditions such as diabetes.     Discussed management strategies with patient including tinnitus masking and cognitive behavioral approaches. Patient could also consider a hearing aid trial with masking features for the left ear. Patient would like to monitor at this time and will contact clinic if he would like to proceed with scheduling this consult.    2. Asymmetrical sensorineural hearing loss  Asymmetric hearing loss with left tinnitus louder than right. Discussed obtaining a MRI to rule out a retrocochlear lesion on the left side. Patient would like to monitor at this time. Recommend returning to clinic in 1 year to recheck hearing.      Patient will follow up in 1 year with audiogram. Sooner for any new changes in hearing.    Anitra Corey DNP, APRN, CNP  Otolaryngology  Head & Neck Surgery  [phone]    30 minutes spent on the date of the encounter doing chart review, history and exam, documentation and further activities per the note

## 2023-03-06 ENCOUNTER — TELEPHONE (OUTPATIENT)
Dept: FAMILY MEDICINE | Facility: CLINIC | Age: 68
End: 2023-03-06
Payer: COMMERCIAL

## 2023-03-06 DIAGNOSIS — E11.9 TYPE 2 DIABETES MELLITUS WITHOUT COMPLICATION, UNSPECIFIED WHETHER LONG TERM INSULIN USE (H): Primary | ICD-10-CM

## 2023-03-06 NOTE — TELEPHONE ENCOUNTER
Order/Referral Request    Who is requesting: Patient    Orders being requested: A1C and Lipid panel    Reason service is needed/diagnosis: Patient would like to discontinue taking Metformin if both labs are normal.    When are orders needed by: patient would like to check A1C and Lipid panel before 03/14/2023 appt with Dr. Nava. Patient would like to check sometime this week if possible.    Has this been discussed with Provider: No    Does patient have a preference on a Group/Provider/Facility? [facility]    Does patient have an appointment scheduled?: No    Where to send orders: Place orders within Epic    Okay to leave a detailed message?: Yes at Cell number on file:    Telephone Information:   Mobile [phone]

## 2023-03-06 NOTE — TELEPHONE ENCOUNTER
No future orders BUT the patient has appointment with PCP 3/14/2023. Can wait until evaluation to discuss or do labs prior to upcoming appointment since patient wants to make change now?

## 2023-03-07 NOTE — TELEPHONE ENCOUNTER
1 with the patient like to have the A1c done this typically takes 15 to 30 minutes, I would not repeat the lipid profile since he has had this 2 months ago

## 2023-03-09 ENCOUNTER — VIRTUAL VISIT (OUTPATIENT)
Dept: PSYCHOLOGY | Facility: CLINIC | Age: 68
End: 2023-03-09
Attending: FAMILY MEDICINE
Payer: COMMERCIAL

## 2023-03-09 DIAGNOSIS — F41.9 ANXIETY DISORDER, UNSPECIFIED TYPE: ICD-10-CM

## 2023-03-09 PROCEDURE — 90832 PSYTX W PT 30 MINUTES: CPT | Mod: 95 | Performed by: SOCIAL WORKER

## 2023-03-09 NOTE — PROGRESS NOTES
"                     Discharge Summary  Single Session    Client Name: Dimitri Almaraz MRN#: [id number] YOB: 1955    Discharge Date:   2023    Service Modality: Phone Visit:      Provider verified identity through the following two step process.  Patient provided:  Patient  and Patient address    Telephone Visit: The patient's condition can be safely assessed and treated via synchronous audio telemedicine encounter.      Reason for Audio Telemedicine Visit: Patient has requested telehealth visit    Originating Site (Patient Location): Patient's home    Distant Site (Provider Location): Provider Remote Setting- Home Office    Consent:  The patient/guardian has verbally consented to:     1. The potential risks and benefits of telemedicine (telephone visit) versus in person care;    The patient has been notified of the following:      \"We have found that certain health care needs can be provided without the need for a face to face visit.  This service lets us provide the care you need with a phone conversation.       I will have full access to your [facility] medical record during this entire phone call.   I will be taking notes for your medical record.      Since this is like an office visit, we will bill your insurance company for this service.       There are potential benefits and risks of telephone visits (e.g. limits to patient confidentiality) that differ from in-person visits.?Confidentiality still applies for telephone services, and nobody will record the visit.  It is important to be in a quiet, private space that is free of distractions (including cell phone or other devices) during the visit.??      If during the course of the call I believe a telephone visit is not appropriate, you will not be charged for this service\"     Consent has been obtained for this service by care team member: Yes     Service Type: Individual      Session Start Time: 12:00  Session End " Time: 12:16      Session Length: 16     Session #: 1     Attendees: Client attended alone      Focus of Treatment Objective(s):  Client's presenting concerns included: Anxiety - support for  Stage of Change at time of Discharge: MAINTENANCE (Working to maintain change, with risk of relapse)    Medication Adherence:  Yes    Chemical Use:  NA    Assessment: Current Emotional / Mental Status (status of significant symptoms):    Risk status (Self / Other harm or suicidal ideation)  Client denies current fears or concerns for personal safety.  Client denies current or recent suicidal ideation or behaviors.  Client denies current or recent homicidal ideation or behaviors.  Client denies current or recent self injurious behavior or ideation.  Client denies other safety concerns.  A safety and risk management plan has not been developed at this time, however client was given the after-hours number should there be a change in any of these risk factors.    Appearance:   unable to assess via telephone   Eye Contact:   unable to assess via telephone   Psychomotor Behavior: unable to assess via telephone   Attitude:   Cooperative   Orientation:   All  Speech   Rate / Production: Normal/ Responsive   Volume:  Normal   Mood:    Normal  Affect:    unable to assess via telephone   Thought Content:  Clear   Thought Form:  Coherent  Logical   Insight:   Good     DSM5 Diagnoses: (Sustained by DSM5 Criteria Listed Above)  Diagnoses: 300.00 (F41.9) Unspecified Anxiety Disorder  Psychosocial & Contextual Factors: Patient having concern about anxiety and is taking medication and has been able to realize some of his own triggers    Reason for Discharge:  Client is satisfied with progress and Referred to [facility] should he need additional support      Aftercare Plan:  Client may resume counseling services at any time in the future by calling the [facility] Intake Office, [phone].  Patient can talk to doctor about referral to clinic therapist for  short term therapy      Kristina Holland, [facility]  March 9, 2023

## 2023-03-09 NOTE — Clinical Note
Helio Hernandez,  Your patient presented to [facility] for a diagnostic evaluation today.  They will not be beginning individual therapy with me.  Patient would benefit from meeting with a Behavioral Health Clinician versus a Psychotherapist.  Patient is interested in one or two sessions, not long-term therapy.  Patient states that their anxiety is fine now and are aware that they can ask you for this referral if anything changes.  Please do not hesitate to contact me if you have any questions in regards to Dimitri Almaraz's care.      KRISTOPHER Johnson  March 9, 2023 [facility]

## 2023-03-10 ENCOUNTER — LAB (OUTPATIENT)
Dept: LAB | Facility: CLINIC | Age: 68
End: 2023-03-10
Payer: COMMERCIAL

## 2023-03-10 ENCOUNTER — TELEPHONE (OUTPATIENT)
Dept: FAMILY MEDICINE | Facility: CLINIC | Age: 68
End: 2023-03-10

## 2023-03-10 DIAGNOSIS — E11.9 TYPE 2 DIABETES MELLITUS WITHOUT COMPLICATION, UNSPECIFIED WHETHER LONG TERM INSULIN USE (H): ICD-10-CM

## 2023-03-10 LAB — HBA1C MFR BLD: 6.1 % (ref 0–5.6)

## 2023-03-10 PROCEDURE — 36415 COLL VENOUS BLD VENIPUNCTURE: CPT

## 2023-03-10 PROCEDURE — 83036 HEMOGLOBIN GLYCOSYLATED A1C: CPT

## 2023-03-10 NOTE — TELEPHONE ENCOUNTER
Called pt and relayed lab result. Pt verbalized understanding.    Ehsan Garza Cem Say, BSN RN  [facility]      ----- Message from Helio Nava MD sent at 3/10/2023  9:25 AM CST -----  Please inform patient that A1c is 6.1 therefore has been quite stable I will repeated in 6 months since that has not changed over the last 2 successive visits

## 2023-03-10 NOTE — RESULT ENCOUNTER NOTE
Please inform patient that A1c is 6.1 therefore has been quite stable I will repeated in 6 months since that has not changed over the last 2 successive visits

## 2023-03-14 ENCOUNTER — OFFICE VISIT (OUTPATIENT)
Dept: FAMILY MEDICINE | Facility: CLINIC | Age: 68
End: 2023-03-14
Payer: COMMERCIAL

## 2023-03-14 VITALS
RESPIRATION RATE: 16 BRPM | DIASTOLIC BLOOD PRESSURE: 77 MMHG | OXYGEN SATURATION: 96 % | HEART RATE: 60 BPM | HEIGHT: 66 IN | BODY MASS INDEX: 25.88 KG/M2 | SYSTOLIC BLOOD PRESSURE: 129 MMHG | TEMPERATURE: 97.9 F | WEIGHT: 161 LBS

## 2023-03-14 DIAGNOSIS — F41.9 ANXIETY AND DEPRESSION: ICD-10-CM

## 2023-03-14 DIAGNOSIS — F32.A ANXIETY AND DEPRESSION: ICD-10-CM

## 2023-03-14 DIAGNOSIS — E11.9 TYPE 2 DIABETES MELLITUS WITHOUT COMPLICATION, WITHOUT LONG-TERM CURRENT USE OF INSULIN (H): ICD-10-CM

## 2023-03-14 DIAGNOSIS — Z76.0 ENCOUNTER FOR MEDICATION REFILL: ICD-10-CM

## 2023-03-14 PROCEDURE — 99214 OFFICE O/P EST MOD 30 MIN: CPT | Performed by: FAMILY MEDICINE

## 2023-03-14 RX ORDER — BLOOD SUGAR DIAGNOSTIC
STRIP MISCELLANEOUS
Qty: 100 STRIP | Refills: 4 | Status: SHIPPED | OUTPATIENT
Start: 2023-03-14

## 2023-03-14 RX ORDER — ATORVASTATIN CALCIUM 40 MG/1
40 TABLET, FILM COATED ORAL DAILY
Qty: 90 TABLET | Refills: 3 | Status: SHIPPED | OUTPATIENT
Start: 2023-03-14 | End: 2024-04-29

## 2023-03-14 RX ORDER — ESCITALOPRAM OXALATE 10 MG/1
10 TABLET ORAL DAILY
Qty: 90 TABLET | Refills: 4 | Status: SHIPPED | OUTPATIENT
Start: 2023-03-14 | End: 2023-12-15

## 2023-03-14 RX ORDER — LANCING DEVICE
EACH MISCELLANEOUS
Qty: 1 EACH | Refills: 0 | Status: SHIPPED | OUTPATIENT
Start: 2023-03-14

## 2023-03-14 ASSESSMENT — ENCOUNTER SYMPTOMS: NERVOUS/ANXIOUS: 1

## 2023-03-14 NOTE — PROGRESS NOTES
"  Assessment & Plan     Encounter for medication refill    - metFORMIN (GLUCOPHAGE) 500 MG tablet; Take 1 tablet (500 mg) by mouth 2 times daily (with meals)  - atorvastatin (LIPITOR) 40 MG tablet; Take 1 tablet (40 mg) by mouth daily    Type 2 diabetes mellitus without complication, without long-term current use of insulin (H)    Discussed his diabetes in detail currently he is exercising by walking 2 to 3 miles a day he is watching his diet he is trying to incorporate brown rice she reports that his blood sugars remain fairly constant.  We will keep the metformin at the same dose and he will recheck an A1c in about 6 months.  - metFORMIN (GLUCOPHAGE) 500 MG tablet; Take 1 tablet (500 mg) by mouth 2 times daily (with meals)  - blood glucose (ONETOUCH VERIO IQ) test strip; USE TO TEST BLOOD SUGAR 1 TIME DAILY OR AS DIRECTED  - blood glucose (NO BRAND SPECIFIED) lancing device; Device to be used with lancets.  - atorvastatin (LIPITOR) 40 MG tablet; Take 1 tablet (40 mg) by mouth daily    Anxiety and depression    Saw psychology did not see much of a benefit with continuing with them thinks that the Lexapro at the current dose is working well he is made some behavioral changes and thinks that he is sleeping better would like to continue medication at this dose  - escitalopram (LEXAPRO) 10 MG tablet; Take 1 tablet (10 mg) by mouth daily             BMI:   Estimated body mass index is 26.38 kg/m  as calculated from the following:    Height as of this encounter: 1.664 m (5' 5.5\").    Weight as of this encounter: 73 kg (161 lb).           No follow-ups on file.    Helio Nava MD  [facility] MG Mosley is a 67 year old, presenting for the following health issues:  Diabetes (Remove metformin from med list) and Anxiety    Patient is following up for diabetes depression and anxiety feels that the medicine is helping with anxiety he has not noticed a difference lately but his wife says that " "he is less irritated and anxious.  Patient also notes that he is sleeping well.  He says his blood glucose levels have been well controlled.  He is wondering whether he should get off the metformin or not.  He did see psychology at a visit did not find it especially useful.  Patient states that he did see ENT specialist regarding his tinnitus who said there was not much that could be done  Anxiety    History of Present Illness       Diabetes:   He presents for follow up of diabetes.  He is checking home blood glucose one time daily. He checks blood glucose before meals.  Blood glucose is never over 200 and never under 70. When his blood glucose is low, the patient is asymptomatic for confusion, blurred vision, lethargy and reports not feeling dizzy, shaky, or weak.  He is concerned about other.  He is not experiencing numbness or burning in feet, excessive thirst, blurry vision, weight changes or redness, sores or blisters on feet.         He eats 4 or more servings of fruits and vegetables daily.He consumes 0 sweetened beverage(s) daily.He exercises with enough effort to increase his heart rate 9 or less minutes per day.  He exercises with enough effort to increase his heart rate 4 days per week.   He is taking medications regularly.             Review of Systems   Psychiatric/Behavioral: The patient is nervous/anxious.       Constitutional, HEENT, cardiovascular, pulmonary, gi and gu systems are negative, except as otherwise noted.      Objective    /77   Pulse 60   Temp 97.9  F (36.6  C) (Oral)   Resp 16   Ht 1.664 m (5' 5.5\")   Wt 73 kg (161 lb)   SpO2 96%   BMI 26.38 kg/m    Body mass index is 26.38 kg/m .  Physical Exam   GENERAL: healthy, alert and no distress  EYES: Eyes grossly normal to inspection, PERRL and conjunctivae and sclerae normal  RESP: lungs clear to auscultation - no rales, rhonchi or wheezes  CV: regular rate and rhythm, normal S1 S2, no S3 or S4, no murmur, click or rub, no " peripheral edema and peripheral pulses strong  MS: no gross musculoskeletal defects noted, no edema  NEURO: Normal strength and tone, mentation intact and speech normal  PSYCH: mentation appears normal, affect normal/bright

## 2023-04-19 ENCOUNTER — TELEPHONE (OUTPATIENT)
Dept: FAMILY MEDICINE | Facility: CLINIC | Age: 68
End: 2023-04-19
Payer: COMMERCIAL

## 2023-04-19 ENCOUNTER — NURSE TRIAGE (OUTPATIENT)
Dept: NURSING | Facility: CLINIC | Age: 68
End: 2023-04-19
Payer: COMMERCIAL

## 2023-04-19 DIAGNOSIS — E11.9 TYPE 2 DIABETES MELLITUS WITHOUT COMPLICATION, WITHOUT LONG-TERM CURRENT USE OF INSULIN (H): ICD-10-CM

## 2023-04-19 DIAGNOSIS — Z76.0 ENCOUNTER FOR MEDICATION REFILL: Primary | ICD-10-CM

## 2023-04-19 RX ORDER — BLOOD-GLUCOSE METER
EACH MISCELLANEOUS DAILY
Qty: 1 KIT | Refills: 0 | Status: SHIPPED | OUTPATIENT
Start: 2023-04-19

## 2023-04-19 NOTE — TELEPHONE ENCOUNTER
RN spoke to PCP regarding high blood glucose. Dr. Nava reccommended pt should not take any extra/additional medication and should recheck his glucose again. If it is still high, pt should be seen by a provider for further evaluation.  - Called pt and relayed message. Pt verbalized understanding.      Pt stated that he thinks his meter is broken. An hour after his high blood glucose, he switch the batteries and checked his blood sugar again and is now at 107.    - pt is wondering if PCP can order a new blood glucose meter for him.    Ehsan Garza Cem Say, BSN RN  [facility]

## 2023-04-19 NOTE — TELEPHONE ENCOUNTER
Checked blood sugar it's 452 this morning. Can stress cause that or what I ate cause that?  I told him both of those can cause an high blood sugar.  In the past it was 68, on the 12th, had juice. The 13th it was 118. Had pasta last night and 1/4 of a chocolate eclaire.  He's on Metformin. Denies any symptoms. Triage results in: Discuss With PCP And Callback By Nurse Within 1 Hour.  Call him at:  [phone].  May leave a detailed message.  Nurse Triage SBAR    Is this a 2nd Level Triage? YES, LICENSED PRACTITIONER REVIEW IS REQUIRED    Situation: Checked blood sugar it's 452 this morning. Can stress cause that or what I ate cause that?  I told him both of those can cause an high blood sugar.  In the past it was 68, on the 12th, had juice. The 13th it was 118. Had pasta last night and 1/4 of a chocolate eclaire.  He's on Metformin. Denies any symptoms. Triage results in: Discuss With PCP And Callback By Nurse Within 1 Hour.  Call him at:  [phone].  May leave a detailed message.    Background: diabetes, on metformin. He's never seen his blood sugar this high.    Assessment: hyperglycemia needs treatment.    Protocol Recommended Disposition:   Discuss With PCP And Callback By Nurse Within 1 Hour    Recommendation: call back and direct in care this morning, to treat hyperglycemia.     Routed to provider    Does the patient meet one of the following criteria for ADS visit consideration? 16+ years old, with an [facility] PCP     TIP  Providers, please consider if this condition is appropriate for management at one of our Acute and Diagnostic Services sites.     If patient is a good candidate, please use dotphrase <dot>triageresponse and select Refer to ADS to document.  Adelina Kirkpatrick RN  [facility]      Reason for Disposition    Blood glucose > 400 mg/dL (22.2 mmol/L)    Additional Information    Negative: Unconscious or difficult to awaken    Negative: Acting confused (e.g., disoriented, slurred  speech)    Negative: Very weak (can't stand)    Negative: Sounds like a life-threatening emergency to the triager    Negative: Vomiting and signs of dehydration (e.g., very dry mouth, lightheaded, dark urine)    Negative: Blood glucose > 240 mg/dL (13.3 mmol/L) and rapid breathing    Negative: Blood glucose > 500 mg/dL (27.8 mmol/L)    Negative: Blood glucose > 240 mg/dL (13.3 mmol/L) AND urine ketones moderate-large (or more than 1+)    Negative: Blood glucose > 240 mg/dL (13.3 mmol/L) and blood ketones > 1.4 mmol/L    Negative: Vomiting lasting > 4 hours    Negative: Patient sounds very sick or weak to the triager    Negative: Fever > 100.4 F (38.0 C)    Negative: Caller has URGENT medication or insulin pump question and triager unable to answer question    Protocols used: DIABETES - HIGH BLOOD SUGAR-A-OH

## 2023-04-19 NOTE — TELEPHONE ENCOUNTER
General Call      Reason for Call:  Nurse triage TE this mormning    What are your questions or concerns:  Pt called back and stated that after he checked his glucose an hour later, it drop down to 107. Pt believes it's his meter that is messed up as it has happened before. He did replace the battery but it still shows low battery. Pt would like a new glucose meter, pt do not like the one touch. Please send in a new meter to pt's pharmacy on file if appropriate. Please call and advise pt on either way, thanks!    Date of last appointment with provider: 3/14/2023    Okay to leave a detailed message?: Yes at Home number on file [phone] (home)

## 2023-04-19 NOTE — TELEPHONE ENCOUNTER
Chart reviewed. Please review findings below.     Patient saw MD on 3/14/2023 for DM follow up. Not certain by med review which meter the patient has been using.     Queued and pended per preferred insurance parameters. Routing to MD.

## 2023-10-06 DIAGNOSIS — Z76.0 ENCOUNTER FOR MEDICATION REFILL: ICD-10-CM

## 2023-10-06 DIAGNOSIS — E11.9 TYPE 2 DIABETES MELLITUS WITHOUT COMPLICATION, WITHOUT LONG-TERM CURRENT USE OF INSULIN (H): ICD-10-CM

## 2023-10-06 RX ORDER — BLOOD SUGAR DIAGNOSTIC
STRIP MISCELLANEOUS
Refills: 0 | OUTPATIENT
Start: 2023-10-06

## 2023-10-06 RX ORDER — LANCETS 33 GAUGE
EACH MISCELLANEOUS
Qty: 100 EACH | Refills: 11 | Status: SHIPPED | OUTPATIENT
Start: 2023-10-06

## 2023-10-09 DIAGNOSIS — Z76.0 ENCOUNTER FOR MEDICATION REFILL: ICD-10-CM

## 2023-10-09 DIAGNOSIS — E11.9 TYPE 2 DIABETES MELLITUS WITHOUT COMPLICATION, WITHOUT LONG-TERM CURRENT USE OF INSULIN (H): ICD-10-CM

## 2023-10-09 RX ORDER — BLOOD SUGAR DIAGNOSTIC
STRIP MISCELLANEOUS
Qty: 100 STRIP | Refills: 3 | Status: SHIPPED | OUTPATIENT
Start: 2023-10-09

## 2023-12-11 ENCOUNTER — TELEPHONE (OUTPATIENT)
Dept: FAMILY MEDICINE | Facility: CLINIC | Age: 68
End: 2023-12-11
Payer: COMMERCIAL

## 2023-12-11 NOTE — TELEPHONE ENCOUNTER
Patient Quality Outreach    Patient is due for the following:   Diabetes -  A1C, LDL (Fasting), Eye Exam, Microalbumin, and Foot Exam  Physical Annual Wellness Visit      Topic Date Due    Zoster (Shingles) Vaccine (1 of 2) Never done    Flu Vaccine (1) Never done    COVID-19 Vaccine (5 - 2023-24 season) 09/01/2023       Next Steps:   Schedule a Annual Wellness Visit    Type of outreach:    Phone, spoke to patient/parent. Appt made      Questions for provider review:    None           Idalia Atkins MA  Chart routed to Care Team.

## 2023-12-15 ENCOUNTER — OFFICE VISIT (OUTPATIENT)
Dept: FAMILY MEDICINE | Facility: CLINIC | Age: 68
End: 2023-12-15
Payer: COMMERCIAL

## 2023-12-15 VITALS
HEART RATE: 54 BPM | DIASTOLIC BLOOD PRESSURE: 66 MMHG | SYSTOLIC BLOOD PRESSURE: 138 MMHG | BODY MASS INDEX: 25.76 KG/M2 | HEIGHT: 66 IN | RESPIRATION RATE: 18 BRPM | OXYGEN SATURATION: 97 % | TEMPERATURE: 97.8 F | WEIGHT: 160.3 LBS

## 2023-12-15 DIAGNOSIS — E11.9 TYPE 2 DIABETES MELLITUS WITHOUT COMPLICATION, WITHOUT LONG-TERM CURRENT USE OF INSULIN (H): ICD-10-CM

## 2023-12-15 DIAGNOSIS — Z29.11 NEED FOR VACCINATION AGAINST RESPIRATORY SYNCYTIAL VIRUS: ICD-10-CM

## 2023-12-15 DIAGNOSIS — F32.A ANXIETY AND DEPRESSION: ICD-10-CM

## 2023-12-15 DIAGNOSIS — E11.9 TYPE 2 DIABETES MELLITUS WITHOUT COMPLICATION, UNSPECIFIED WHETHER LONG TERM INSULIN USE (H): ICD-10-CM

## 2023-12-15 DIAGNOSIS — F41.9 ANXIETY AND DEPRESSION: ICD-10-CM

## 2023-12-15 DIAGNOSIS — L98.9 SKIN LESION OF SCALP: ICD-10-CM

## 2023-12-15 DIAGNOSIS — Z00.00 WELLNESS EXAMINATION: Primary | ICD-10-CM

## 2023-12-15 LAB
ALBUMIN SERPL BCG-MCNC: 4.4 G/DL (ref 3.5–5.2)
ALP SERPL-CCNC: 67 U/L (ref 40–150)
ALT SERPL W P-5'-P-CCNC: 24 U/L (ref 0–70)
ANION GAP SERPL CALCULATED.3IONS-SCNC: 8 MMOL/L (ref 7–15)
AST SERPL W P-5'-P-CCNC: 22 U/L (ref 0–45)
BILIRUB SERPL-MCNC: 0.7 MG/DL
BUN SERPL-MCNC: 19.8 MG/DL (ref 8–23)
CALCIUM SERPL-MCNC: 9.6 MG/DL (ref 8.8–10.2)
CHLORIDE SERPL-SCNC: 107 MMOL/L (ref 98–107)
CHOLEST SERPL-MCNC: 121 MG/DL
CREAT SERPL-MCNC: 1.03 MG/DL (ref 0.67–1.17)
DEPRECATED HCO3 PLAS-SCNC: 25 MMOL/L (ref 22–29)
EGFRCR SERPLBLD CKD-EPI 2021: 78 ML/MIN/1.73M2
FASTING STATUS PATIENT QL REPORTED: YES
GLUCOSE SERPL-MCNC: 113 MG/DL (ref 70–99)
HBA1C MFR BLD: 6.2 % (ref 0–5.6)
HDLC SERPL-MCNC: 48 MG/DL
LDLC SERPL CALC-MCNC: 59 MG/DL
NONHDLC SERPL-MCNC: 73 MG/DL
POTASSIUM SERPL-SCNC: 5.2 MMOL/L (ref 3.4–5.3)
PROT SERPL-MCNC: 6.9 G/DL (ref 6.4–8.3)
PSA SERPL DL<=0.01 NG/ML-MCNC: 2.95 NG/ML (ref 0–6.5)
SODIUM SERPL-SCNC: 140 MMOL/L (ref 135–145)
TRIGL SERPL-MCNC: 70 MG/DL

## 2023-12-15 PROCEDURE — 99213 OFFICE O/P EST LOW 20 MIN: CPT | Mod: 25 | Performed by: FAMILY MEDICINE

## 2023-12-15 PROCEDURE — 80061 LIPID PANEL: CPT | Performed by: FAMILY MEDICINE

## 2023-12-15 PROCEDURE — 99397 PER PM REEVAL EST PAT 65+ YR: CPT | Performed by: FAMILY MEDICINE

## 2023-12-15 PROCEDURE — 36415 COLL VENOUS BLD VENIPUNCTURE: CPT | Performed by: FAMILY MEDICINE

## 2023-12-15 PROCEDURE — G0103 PSA SCREENING: HCPCS | Performed by: FAMILY MEDICINE

## 2023-12-15 PROCEDURE — 80053 COMPREHEN METABOLIC PANEL: CPT | Performed by: FAMILY MEDICINE

## 2023-12-15 PROCEDURE — 83036 HEMOGLOBIN GLYCOSYLATED A1C: CPT | Performed by: FAMILY MEDICINE

## 2023-12-15 RX ORDER — ESCITALOPRAM OXALATE 20 MG/1
20 TABLET ORAL DAILY
Qty: 90 TABLET | Refills: 3 | Status: SHIPPED | OUTPATIENT
Start: 2023-12-15 | End: 2024-05-03

## 2023-12-15 RX ORDER — RESPIRATORY SYNCYTIAL VIRUS VACCINE 120MCG/0.5
0.5 KIT INTRAMUSCULAR ONCE
Qty: 1 EACH | Refills: 0 | Status: SHIPPED | OUTPATIENT
Start: 2023-12-15 | End: 2023-12-15

## 2023-12-15 ASSESSMENT — ENCOUNTER SYMPTOMS
HEARTBURN: 1
ARTHRALGIAS: 1
NERVOUS/ANXIOUS: 1

## 2023-12-15 ASSESSMENT — ACTIVITIES OF DAILY LIVING (ADL): CURRENT_FUNCTION: NO ASSISTANCE NEEDED

## 2023-12-15 NOTE — PROGRESS NOTES
SUBJECTIVE:   Dimitri is a 68 year old, presenting for the following:  Annual Visit (Annual wellness and DM)        12/15/2023     9:53 AM   Additional Questions   Roomed by Yuriy LOPEZ   Accompanied by Self       Are you in the first 12 months of your Medicare coverage?  No    HPI  68-year-old male here for annual wellness exam also has questions regarding his diabetes and anxiety depression he also has a skin lesion which she would like me to examine.  He reports he is thinking that he is doing well with his diabetes he says he been taking the medication he has not noticed any high values typical values range between 100-120.  He reports that he thinks the Lexapro is working but he has noticed these have more outburst especially with being snappy and anxious since he had less to do at home he is wondering if the dose of the medication can be modified.  He is also noted 1 scalp lesion that is been on the top on the left side of his forehead it is not bleeding but seems to be regrowing and not increasing in size he says he has been picking at it occasionally      Have you ever done Advance Care Planning? (For example, a Health Directive, POLST, or a discussion with a medical provider or your loved ones about your wishes): Yes, advance care planning is on file.       Fall risk  Fallen 2 or more times in the past year?: No  Any fall with injury in the past year?: No    Cognitive Screening   1) Repeat 3 items (Letters, Season, Table)    2) Clock draw: NORMAL  3) 3 item recall: Recalls 2 objects (Letters, Table)  Results: NORMAL clock, 1-2 items recalled: COGNITIVE IMPAIRMENT LESS LIKELY    Mini-CogTM Copyright RICHA Alvarado. Licensed by the author for use in [facility]; reprinted with permission (osmar@.[email or web address]). All rights reserved.          Reviewed and updated as needed this visit by clinical staff   Tobacco  Allergies  Meds              Reviewed and updated as needed this visit by Provider                 Social  "History     Tobacco Use     Smoking status: Former     Packs/day: 0.25     Years: 40.00     Additional pack years: 0.00     Total pack years: 10.00     Types: Cigarettes     Quit date: 5/10/2019     Years since quittin.6     Passive exposure: Never     Smokeless tobacco: Never     Tobacco comments:     1 pack per week   Substance Use Topics     Alcohol use: Yes     Alcohol/week: 0.0 standard drinks of alcohol     Comment: Alcoholic Drinks/day: \"some...... weekends\"             12/15/2023    10:00 AM   Alcohol Use   Prescreen: >3 drinks/day or >7 drinks/week? No     Do you have a current opioid prescription? No  Do you use any other controlled substances or medications that are not prescribed by a provider? None              Current providers sharing in care for this patient include:   Patient Care Team:  Helio Nava MD as PCP - General (Family Medicine)  Helio Nava MD as Assigned PCP  Pooja Boyce, PharmD as Pharmacist (Pharmacist)  Caridad Murray AuD as Audiologist (Audiology)  Mirta Corey NP as Assigned Surgical Provider    The following health maintenance items are reviewed in Epic and correct as of today:  Health Maintenance   Topic Date Due     ADVANCE CARE PLANNING  Never done     ZOSTER IMMUNIZATION (1 of 2) Never done     LUNG CANCER SCREENING  Never done     RSV VACCINE (Pregnancy & 60+) (1 - 1-dose 60+ series) Never done     AORTIC ANEURYSM SCREENING (SYSTEM ASSIGNED)  Never done     MEDICARE ANNUAL WELLNESS VISIT  2020     INFLUENZA VACCINE (1) Never done     COVID-19 Vaccine ( - - season) 2023     EYE EXAM  11/10/2023     BMP  2023     LIPID  2023     MICROALBUMIN  2023     DIABETIC FOOT EXAM  2023     ANNUAL REVIEW OF HM ORDERS  2024     A1C  06/15/2024     DTAP/TDAP/TD IMMUNIZATION (3 - Td or Tdap) 2024     COLORECTAL CANCER SCREENING  2024     FALL RISK ASSESSMENT  12/15/2024     HEPATITIS C SCREENING  Completed     " "PHQ-2 (once per calendar year)  Completed     Pneumococcal Vaccine: 65+ Years  Completed     IPV IMMUNIZATION  Aged Out     HPV IMMUNIZATION  Aged Out     MENINGITIS IMMUNIZATION  Aged Out     RSV MONOCLONAL ANTIBODY  Aged Out     Lab work is in process  Labs reviewed in EPIC          Review of Systems  Constitutional, HEENT, cardiovascular, pulmonary, GI, , musculoskeletal, neuro, skin, endocrine and psych systems are negative, except as otherwise noted.    OBJECTIVE:   BP (!) 142/71   Pulse 54   Temp 97.8  F (36.6  C) (Oral)   Resp 18   Ht 1.67 m (5' 5.75\")   Wt 72.7 kg (160 lb 4.8 oz)   SpO2 97%   BMI 26.07 kg/m   Estimated body mass index is 26.07 kg/m  as calculated from the following:    Height as of this encounter: 1.67 m (5' 5.75\").    Weight as of this encounter: 72.7 kg (160 lb 4.8 oz).  Physical Exam  GENERAL: healthy, alert and no distress  EYES: Eyes grossly normal to inspection, PERRL and conjunctivae and sclerae normal  HENT: ear canals and TM's normal, nose and mouth without ulcers or lesions  NECK: no adenopathy, no asymmetry, masses, or scars and thyroid normal to palpation  RESP: lungs clear to auscultation - no rales, rhonchi or wheezes  CV: regular rate and rhythm, normal S1 S2, no S3 or S4, no murmur, click or rub, no peripheral edema and peripheral pulses strong  ABDOMEN: soft, nontender, no hepatosplenomegaly, no masses and bowel sounds normal  MS: no gross musculoskeletal defects noted, no edema  SKIN: 1 elevated nonpigmented scalp lesion noted approximately 3 cm from the left temporal area measures 0.1 by point 1 cm nontender no discharge  NEURO: Normal strength and tone, mentation intact and speech normal  PSYCH: mentation appears normal, affect normal/bright        ASSESSMENT / PLAN:       ICD-10-CM    1. Wellness examination  Z00.00 Comprehensive metabolic panel (BMP + Alb, Alk Phos, ALT, AST, Total. Bili, TP)     PSA, screen     Comprehensive metabolic panel (BMP + Alb, Alk " "Phos, ALT, AST, Total. Bili, TP)     PSA, screen      2. Type 2 diabetes mellitus without complication, unspecified whether long term insulin use (H)  E11.9 Hemoglobin A1c     Lipid panel     Comprehensive metabolic panel (BMP + Alb, Alk Phos, ALT, AST, Total. Bili, TP)     Hemoglobin A1c     Lipid panel     Comprehensive metabolic panel (BMP + Alb, Alk Phos, ALT, AST, Total. Bili, TP)      3. Need for vaccination against respiratory syncytial virus he will get this done at the pharmacy Z29.11       4. Type 2 diabetes mellitus without complication (H) A1c slightly higher than last value but still below 6.5 he is happy with his current regimen and will continue to exercise E11.9       5. Anxiety and depression patient noted alteration in mood and being more anxious and irritable he would like to try dose adjustment of the Lexapro he has not any side effects to the medication at 10 mg and increasing it to 20 mg F41.9 escitalopram (LEXAPRO) 20 MG tablet    F32.A       6. Skin lesion of scalp after obtaining verbal consent from the patient the area was treated with cryotherapy x 3 applications he had minimal discomfort and will follow-up as needed.  I informed him that the scalp lesion has a differential diagnosis of actinic keratosis, folliculitis, or reactive skin tissue tag because of heated excoriation L98.9                 COUNSELING:  Reviewed preventive health counseling, as reflected in patient instructions       Regular exercise       Healthy diet/nutrition       Vision screening       Hearing screening      BMI:   Estimated body mass index is 26.07 kg/m  as calculated from the following:    Height as of this encounter: 1.67 m (5' 5.75\").    Weight as of this encounter: 72.7 kg (160 lb 4.8 oz).         He reports that he quit smoking about 4 years ago. His smoking use included cigarettes. He has a 10.00 pack-year smoking history. He has never been exposed to tobacco smoke. He has never used smokeless " tobacco.      Appropriate preventive services were discussed with this patient, including applicable screening as appropriate for fall prevention, nutrition, physical activity, Tobacco-use cessation, weight loss and cognition.  Checklist reviewing preventive services available has been given to the patient.    Reviewed patients plan of care and provided an AVS. The Basic Care Plan (routine screening as documented in Health Maintenance) for Dimitri meets the Care Plan requirement. This Care Plan has been established and reviewed with the Patient.        Helio Nava MD  [facility]    Identified Health Risks:

## 2023-12-18 ENCOUNTER — PATIENT OUTREACH (OUTPATIENT)
Dept: GASTROENTEROLOGY | Facility: CLINIC | Age: 68
End: 2023-12-18
Payer: COMMERCIAL

## 2023-12-19 NOTE — RESULT ENCOUNTER NOTE
Please inform patient that electrolytes are normal and kidney functions normal his liver function is also normal as is his cholesterol thank you

## 2024-02-09 ENCOUNTER — TELEPHONE (OUTPATIENT)
Dept: FAMILY MEDICINE | Facility: CLINIC | Age: 69
End: 2024-02-09

## 2024-02-09 NOTE — TELEPHONE ENCOUNTER
Reason for Call:  Appointment Request    Patient requesting this type of appt:  re treat wart on head    Requested provider: Helio Nava    Reason patient unable to be scheduled: Not within requested timeframe    When does patient want to be seen/preferred time:  any    Comments: patient wondering if he could be worked in with Dr Nava. Please call him asap to let him know.     Okay to leave a detailed message?: Yes at Home number on file [phone] (home)    Call taken on 2/9/2024 at 12:07 PM by Erika Parmar

## 2024-02-09 NOTE — TELEPHONE ENCOUNTER
Called pt in an attempt to assist pt in scheduling appt. Pt stated he saw Dr. Nava on 12/15/23 and Dr. Nava froze a wart on his head for him. It was going away but it came back.    Pt would like to see Dr. Nava only. RN offered appt with other providers but pt refused. Dr. Nava does not have any opening. Pt is wondering if PCP can see him anytime soon or use one of the approval required/pre-op slot?        Ehsan Garza Cem Say, BSN RN  [facility]

## 2024-02-16 NOTE — TELEPHONE ENCOUNTER
Pt calling back, has not heard back about his request. Pt is wondering if PCP can please call him or squeeze him in, He would like to see PCP one more time before he leaves.

## 2024-02-19 NOTE — TELEPHONE ENCOUNTER
Pt calling back to follow up on request. TC let Pt know we can check with PCP tomorrow, but no response from PCP as of yet.

## 2024-02-26 NOTE — TELEPHONE ENCOUNTER
Pt calling back to see if PCP can look at his wart on head that has returned. Pt asking if PCP can squeeze him in or send him to dermatology.

## 2024-03-12 NOTE — TELEPHONE ENCOUNTER
Dr Nava has sprayed area once .    Do you want to see pt once more for or give referral for dermatology    He has been waiting for response for quite a while.     Anabela Rodriguez, RN, BSN  [facility]

## 2024-03-13 DIAGNOSIS — L98.9 SKIN LESION OF SCALP: Primary | ICD-10-CM

## 2024-03-13 DIAGNOSIS — B07.9 VIRAL WARTS, UNSPECIFIED TYPE: ICD-10-CM

## 2024-04-22 ENCOUNTER — TRANSFERRED RECORDS (OUTPATIENT)
Dept: HEALTH INFORMATION MANAGEMENT | Facility: CLINIC | Age: 69
End: 2024-04-22
Payer: COMMERCIAL

## 2024-04-25 ENCOUNTER — TELEPHONE (OUTPATIENT)
Dept: FAMILY MEDICINE | Facility: CLINIC | Age: 69
End: 2024-04-25
Payer: COMMERCIAL

## 2024-04-25 DIAGNOSIS — E78.2 MIXED HYPERLIPIDEMIA: ICD-10-CM

## 2024-04-25 DIAGNOSIS — E11.9 TYPE 2 DIABETES MELLITUS WITHOUT COMPLICATION, WITHOUT LONG-TERM CURRENT USE OF INSULIN (H): Primary | ICD-10-CM

## 2024-04-25 NOTE — TELEPHONE ENCOUNTER
General Call  Contacts         Type Contact Phone/Fax    04/25/2024 07:34 AM CDT Phone (Incoming) Dimitri Almaraz (Self) [phone] (M)        Reason for Call:  requesting lipid, A1C and liver labs     What are your questions or concerns:  Pt has upcoming appointment with  on 05/03. Requesting lab orders to be done prior to appointment so results could be discussed at [facility] appt.     Date of last appointment with provider: 12/15/2023 with Elijah Blanchard to leave a detailed message?: Yes at Cell number on file:    Telephone Information:   Mobile [phone]

## 2024-04-26 ENCOUNTER — LAB (OUTPATIENT)
Dept: LAB | Facility: CLINIC | Age: 69
End: 2024-04-26
Payer: COMMERCIAL

## 2024-04-26 DIAGNOSIS — E11.9 TYPE 2 DIABETES MELLITUS WITHOUT COMPLICATION, WITHOUT LONG-TERM CURRENT USE OF INSULIN (H): ICD-10-CM

## 2024-04-26 DIAGNOSIS — E78.2 MIXED HYPERLIPIDEMIA: ICD-10-CM

## 2024-04-26 LAB
ALBUMIN SERPL BCG-MCNC: 4.1 G/DL (ref 3.5–5.2)
ALP SERPL-CCNC: 75 U/L (ref 40–150)
ALT SERPL W P-5'-P-CCNC: 21 U/L (ref 0–70)
ANION GAP SERPL CALCULATED.3IONS-SCNC: 8 MMOL/L (ref 7–15)
AST SERPL W P-5'-P-CCNC: 21 U/L (ref 0–45)
BILIRUB SERPL-MCNC: 0.4 MG/DL
BUN SERPL-MCNC: 18.2 MG/DL (ref 8–23)
CALCIUM SERPL-MCNC: 9 MG/DL (ref 8.8–10.2)
CHLORIDE SERPL-SCNC: 112 MMOL/L (ref 98–107)
CHOLEST SERPL-MCNC: 90 MG/DL
CREAT SERPL-MCNC: 1.06 MG/DL (ref 0.67–1.17)
DEPRECATED HCO3 PLAS-SCNC: 24 MMOL/L (ref 22–29)
EGFRCR SERPLBLD CKD-EPI 2021: 75 ML/MIN/1.73M2
FASTING STATUS PATIENT QL REPORTED: NORMAL
GLUCOSE SERPL-MCNC: 124 MG/DL (ref 70–99)
HBA1C MFR BLD: 6.1 % (ref 0–5.6)
HDLC SERPL-MCNC: 40 MG/DL
LDLC SERPL CALC-MCNC: 40 MG/DL
NONHDLC SERPL-MCNC: 50 MG/DL
POTASSIUM SERPL-SCNC: 4.6 MMOL/L (ref 3.4–5.3)
PROT SERPL-MCNC: 6.5 G/DL (ref 6.4–8.3)
SODIUM SERPL-SCNC: 144 MMOL/L (ref 135–145)
TRIGL SERPL-MCNC: 52 MG/DL

## 2024-04-26 PROCEDURE — 80053 COMPREHEN METABOLIC PANEL: CPT

## 2024-04-26 PROCEDURE — 36415 COLL VENOUS BLD VENIPUNCTURE: CPT

## 2024-04-26 PROCEDURE — 83036 HEMOGLOBIN GLYCOSYLATED A1C: CPT

## 2024-04-26 PROCEDURE — 80061 LIPID PANEL: CPT

## 2024-04-27 DIAGNOSIS — E11.9 TYPE 2 DIABETES MELLITUS WITHOUT COMPLICATION, WITHOUT LONG-TERM CURRENT USE OF INSULIN (H): ICD-10-CM

## 2024-04-27 DIAGNOSIS — Z76.0 ENCOUNTER FOR MEDICATION REFILL: ICD-10-CM

## 2024-04-29 RX ORDER — ATORVASTATIN CALCIUM 40 MG/1
40 TABLET, FILM COATED ORAL DAILY
Qty: 90 TABLET | Refills: 1 | Status: SHIPPED | OUTPATIENT
Start: 2024-04-29 | End: 2024-05-03

## 2024-05-03 ENCOUNTER — OFFICE VISIT (OUTPATIENT)
Dept: FAMILY MEDICINE | Facility: CLINIC | Age: 69
End: 2024-05-03
Payer: COMMERCIAL

## 2024-05-03 VITALS
HEIGHT: 66 IN | SYSTOLIC BLOOD PRESSURE: 128 MMHG | TEMPERATURE: 98.2 F | OXYGEN SATURATION: 98 % | DIASTOLIC BLOOD PRESSURE: 67 MMHG | HEART RATE: 56 BPM | BODY MASS INDEX: 26.36 KG/M2 | WEIGHT: 164.04 LBS | RESPIRATION RATE: 18 BRPM

## 2024-05-03 DIAGNOSIS — E11.9 TYPE 2 DIABETES MELLITUS WITHOUT COMPLICATION, WITHOUT LONG-TERM CURRENT USE OF INSULIN (H): Primary | ICD-10-CM

## 2024-05-03 DIAGNOSIS — F41.9 ANXIETY AND DEPRESSION: ICD-10-CM

## 2024-05-03 DIAGNOSIS — F32.A ANXIETY AND DEPRESSION: ICD-10-CM

## 2024-05-03 PROCEDURE — 99214 OFFICE O/P EST MOD 30 MIN: CPT | Performed by: FAMILY MEDICINE

## 2024-05-03 RX ORDER — RESPIRATORY SYNCYTIAL VIRUS VACCINE 120MCG/0.5
0.5 KIT INTRAMUSCULAR ONCE
Qty: 1 EACH | Refills: 0 | Status: CANCELLED | OUTPATIENT
Start: 2024-05-03 | End: 2024-05-03

## 2024-05-03 RX ORDER — ATORVASTATIN CALCIUM 40 MG/1
40 TABLET, FILM COATED ORAL DAILY
Qty: 90 TABLET | Refills: 3 | Status: SHIPPED | OUTPATIENT
Start: 2024-05-03

## 2024-05-03 RX ORDER — ESCITALOPRAM OXALATE 20 MG/1
20 TABLET ORAL DAILY
Qty: 90 TABLET | Refills: 3 | Status: SHIPPED | OUTPATIENT
Start: 2024-05-03

## 2024-05-03 NOTE — PROGRESS NOTES
Assessment & Plan     Type 2 diabetes mellitus without complication, without long-term current use of insulin (H)    Controlled.    - atorvastatin (LIPITOR) 40 MG tablet  Dispense: 90 tablet; Refill: 3  - metFORMIN (GLUCOPHAGE) 500 MG tablet  Dispense: 180 tablet; Refill: 3    Anxiety and depression    Stable.    - escitalopram (LEXAPRO) 20 MG tablet  Dispense: 90 tablet; Refill: 3      He will consider Shingrix.        37 minutes spent by me on the date of the encounter doing chart review, review of test results, and patient visit regarding DM, anxiety.            Subjective   Dimitri is a 68 year old, presenting for the following health issues:  Recheck Medication, pt is here to meet new doc, and Diabetes      5/3/2024     2:00 PM   Additional Questions   Roomed by ade morris   Accompanied by self     History of Present Illness       Reason for visit:  Meet ad rafaela new doctor    He eats 2-3 servings of fruits and vegetables daily.He consumes 0 sweetened beverage(s) daily.He exercises with enough effort to increase his heart rate 9 or less minutes per day.  He exercises with enough effort to increase his heart rate 3 or less days per week.   He is taking medications regularly.             A1c was 6.1 last week.  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    LDL was 40 last week.    Saw and his wife will travel in  this summer and fall.    Current Outpatient Medications   Medication Sig Dispense Refill    atorvastatin (LIPITOR) 40 MG tablet Take 1 tablet (40 mg) by mouth daily 90 tablet 3    blood glucose (ACCU-CHEK GUIDE) test strip Use to test blood sugar one time daily or as directed. 100 strip 3    blood glucose (NO BRAND SPECIFIED) lancets standard by In Vitro route daily Use to test blood sugar 3 times daily or as directed. 100 lancet. 2    blood glucose (NO BRAND SPECIFIED) lancets standard Use to test blood sugar 1 times daily or as directed. 100 each 3    blood glucose (NO BRAND SPECIFIED)  "lancing device Device to be used with lancets. 1 each 0    blood glucose (ONETOUCH ULTRA) test strip use 1 strip to test blood sugar 3 times daily or as directed. 100 strip 3    blood glucose (ONETOUCH VERIO IQ) test strip USE TO TEST BLOOD SUGAR 1 TIME DAILY OR AS DIRECTED 100 strip 4    blood glucose monitoring (ONE TOUCH ULTRA 2) meter device kit by In Vitro route daily Use to test blood sugar 1 (ONE) time daily. 1 kit 0    escitalopram (LEXAPRO) 20 MG tablet Take 1 tablet (20 mg) by mouth daily 90 tablet 3    Lancets (ONETOUCH DELICA PLUS [id number]) MISC Use to test blood sugar 3 times daily or as directed. 100 each 11    metFORMIN (GLUCOPHAGE) 500 MG tablet Take 1 tablet (500 mg) by mouth 2 times daily (with meals) 180 tablet 3    ASPIRIN NOT PRESCRIBED (INTENTIONAL) Please choose reason not prescribed from choices below. (Patient not taking: Reported on 12/15/2023)               Objective    /67   Pulse 56   Temp 98.2  F (36.8  C) (Oral)   Resp 18   Ht 1.665 m (5' 5.55\")   Wt 74.4 kg (164 lb 0.6 oz)   SpO2 98%   BMI 26.84 kg/m    Body mass index is 26.84 kg/m .  Physical Exam     Heart normal  Lungs normal          Signed Electronically by: Richmond Caldwell MD    "

## 2024-06-20 ENCOUNTER — PATIENT OUTREACH (OUTPATIENT)
Dept: GASTROENTEROLOGY | Facility: CLINIC | Age: 69
End: 2024-06-20
Payer: COMMERCIAL

## 2024-06-20 DIAGNOSIS — Z12.11 SPECIAL SCREENING FOR MALIGNANT NEOPLASMS, COLON: Primary | ICD-10-CM

## 2024-06-20 NOTE — PROGRESS NOTES
"CRC Screening Colonoscopy Referral Review    Patient meets the inclusion criteria for screening colonoscopy standing order.    Ordering/Referring Provider:  Richmond Caldwell      BMI: Estimated body mass index is 26.84 kg/m  as calculated from the following:    Height as of 5/3/24: 1.665 m (5' 5.55\").    Weight as of 5/3/24: 74.4 kg (164 lb 0.6 oz).     Sedation:  Does patient have any of the following conditions affecting sedation?  No medical conditions affecting sedation.    Previous Scopes:  Any previous recommendations or follow up needs based on previous scope?  na / No recommendations.    Medical Concerns to Postpone Order:  Does patient have any of the following medical concerns that should postpone/delay colonoscopy referral?  No medical conditions affecting colonoscopy referral.    Final Referral Details:  Based on patient's medical history patient is appropriate for referral order with moderate sedation. If patient's BMI > 50 do not schedule in ASC.  "

## 2024-06-20 NOTE — LETTER
6/20/2024      Dimitri Almaraz  [address]  [place] [address]      David Mosley,    We hope this letter finds you doing well.     Your health is important to us at [facility]. Our records indicate that you could be due, or possibly overdue, for a screening or surveillance colonoscopy.     An order has been placed for you to have this completed. Our scheduling team will be reaching out to you to assist in getting this scheduled. If you do not hear from them within 7 days or you would like to schedule sooner, please call [phone], option 2 for endoscopy scheduling.     If you believe this is in error and have already had a colonoscopy done, please have your results and recommendations faxed to [phone].    If you have questions about this order or have further concerns, please reach out to your primary care provider.     Alexei     Colorectal Cancer RN Screening Team  [facility]

## 2024-09-05 ENCOUNTER — TELEPHONE (OUTPATIENT)
Dept: FAMILY MEDICINE | Facility: CLINIC | Age: 69
End: 2024-09-05
Payer: COMMERCIAL

## 2024-09-05 NOTE — TELEPHONE ENCOUNTER
"  Medication Question or Refill          What medication are you calling about (include dose and sig)?: metformin 500mg twice daily with meals     Preferred Pharmacy:   [id number] - [place], [place] - [address]  [address]  [place] [address]  Phone: [phone] Fax: [phone]      Controlled Substance Agreement on file:   CSA -- Patient Level:    CSA: None found at the patient level.       Who prescribed the medication?: Dr Caldwell    Do you need a refill? No    When did you use the medication last? This am     Patient offered an appointment? Yes: 10/23/2024 for DM follow up     Do you have any questions or concerns?  Yes: Patient is \"doubling up\" on his Metformin 500mg twice daily . Stated his BS are running 120 and normally runs 100. His appetite has increased and really has to watch his eating habits.  Wonders if may be from the atorvastatin?      Okay to leave a detailed message?: Yes at Cell number on file:    Telephone Information:   Mobile [phone]      "

## 2024-09-05 NOTE — TELEPHONE ENCOUNTER
Patient Quality Outreach    Patient is due for the following:   Diabetes -  Diabetic Follow-Up Visit    Next Steps:   Patient was scheduled for DM check     Type of outreach:    Phone, spoke to patient/parent. patient      Questions for provider review:    None           Veronica Rodriguez MA

## 2024-09-06 DIAGNOSIS — E11.9 TYPE 2 DIABETES MELLITUS WITHOUT COMPLICATION, WITHOUT LONG-TERM CURRENT USE OF INSULIN (H): ICD-10-CM

## 2024-09-06 NOTE — TELEPHONE ENCOUNTER
Please call pt.  I sent Rx for metformin 2 tablets twice daily.  Atorvastatin does not usually increase appetite, but it is possible.  Thanks - tina

## 2024-09-06 NOTE — TELEPHONE ENCOUNTER
Okay to leave a detailed message?: Yes at Cell number on file:        Telephone Information:   Mobile [phone]     Left detailed message per patient request.

## 2024-09-06 NOTE — TELEPHONE ENCOUNTER
Pt asking if he can get a lipid test in time for his appt in October. Pt asking if PCP can place order and call him to schedule once order are placed. Please call him at [phone].

## 2024-10-10 ENCOUNTER — TRANSFERRED RECORDS (OUTPATIENT)
Dept: HEALTH INFORMATION MANAGEMENT | Facility: CLINIC | Age: 69
End: 2024-10-10
Payer: COMMERCIAL

## 2024-10-15 DIAGNOSIS — E78.2 MIXED HYPERLIPIDEMIA: ICD-10-CM

## 2024-10-15 DIAGNOSIS — E11.9 TYPE 2 DIABETES MELLITUS WITHOUT COMPLICATION, WITHOUT LONG-TERM CURRENT USE OF INSULIN (H): Primary | ICD-10-CM

## 2024-10-21 ENCOUNTER — LAB (OUTPATIENT)
Dept: LAB | Facility: CLINIC | Age: 69
End: 2024-10-21
Payer: COMMERCIAL

## 2024-10-21 DIAGNOSIS — E78.2 MIXED HYPERLIPIDEMIA: ICD-10-CM

## 2024-10-21 DIAGNOSIS — E11.9 TYPE 2 DIABETES MELLITUS WITHOUT COMPLICATION, WITHOUT LONG-TERM CURRENT USE OF INSULIN (H): ICD-10-CM

## 2024-10-21 LAB
ANION GAP SERPL CALCULATED.3IONS-SCNC: 10 MMOL/L (ref 7–15)
BUN SERPL-MCNC: 17.3 MG/DL (ref 8–23)
CALCIUM SERPL-MCNC: 10 MG/DL (ref 8.8–10.4)
CHLORIDE SERPL-SCNC: 107 MMOL/L (ref 98–107)
CHOLEST SERPL-MCNC: 117 MG/DL
CREAT SERPL-MCNC: 1.08 MG/DL (ref 0.67–1.17)
CREAT UR-MCNC: 53.4 MG/DL
EGFRCR SERPLBLD CKD-EPI 2021: 73 ML/MIN/1.73M2
EST. AVERAGE GLUCOSE BLD GHB EST-MCNC: 123 MG/DL
FASTING STATUS PATIENT QL REPORTED: YES
FASTING STATUS PATIENT QL REPORTED: YES
GLUCOSE SERPL-MCNC: 121 MG/DL (ref 70–99)
HBA1C MFR BLD: 5.9 % (ref 0–5.6)
HCO3 SERPL-SCNC: 25 MMOL/L (ref 22–29)
HDLC SERPL-MCNC: 49 MG/DL
LDLC SERPL CALC-MCNC: 54 MG/DL
MICROALBUMIN UR-MCNC: <12 MG/L
MICROALBUMIN/CREAT UR: NORMAL MG/G{CREAT}
NONHDLC SERPL-MCNC: 68 MG/DL
POTASSIUM SERPL-SCNC: 4.7 MMOL/L (ref 3.4–5.3)
SODIUM SERPL-SCNC: 142 MMOL/L (ref 135–145)
TRIGL SERPL-MCNC: 68 MG/DL

## 2024-10-21 PROCEDURE — 36415 COLL VENOUS BLD VENIPUNCTURE: CPT

## 2024-10-21 PROCEDURE — 82043 UR ALBUMIN QUANTITATIVE: CPT

## 2024-10-21 PROCEDURE — 80061 LIPID PANEL: CPT

## 2024-10-21 PROCEDURE — 82570 ASSAY OF URINE CREATININE: CPT

## 2024-10-21 PROCEDURE — 80048 BASIC METABOLIC PNL TOTAL CA: CPT

## 2024-10-21 PROCEDURE — 83036 HEMOGLOBIN GLYCOSYLATED A1C: CPT

## 2024-10-23 ENCOUNTER — OFFICE VISIT (OUTPATIENT)
Dept: FAMILY MEDICINE | Facility: CLINIC | Age: 69
End: 2024-10-23
Payer: COMMERCIAL

## 2024-10-23 ENCOUNTER — TELEPHONE (OUTPATIENT)
Dept: FAMILY MEDICINE | Facility: CLINIC | Age: 69
End: 2024-10-23

## 2024-10-23 VITALS
SYSTOLIC BLOOD PRESSURE: 122 MMHG | OXYGEN SATURATION: 99 % | RESPIRATION RATE: 16 BRPM | TEMPERATURE: 97.9 F | DIASTOLIC BLOOD PRESSURE: 60 MMHG | WEIGHT: 161.4 LBS | HEIGHT: 66 IN | HEART RATE: 63 BPM | BODY MASS INDEX: 25.94 KG/M2

## 2024-10-23 DIAGNOSIS — M79.645 THUMB PAIN, LEFT: ICD-10-CM

## 2024-10-23 DIAGNOSIS — E78.2 MIXED HYPERLIPIDEMIA: ICD-10-CM

## 2024-10-23 DIAGNOSIS — E11.9 TYPE 2 DIABETES MELLITUS WITHOUT COMPLICATION, WITHOUT LONG-TERM CURRENT USE OF INSULIN (H): Primary | ICD-10-CM

## 2024-10-23 PROCEDURE — 99214 OFFICE O/P EST MOD 30 MIN: CPT | Performed by: FAMILY MEDICINE

## 2024-10-23 PROCEDURE — G2211 COMPLEX E/M VISIT ADD ON: HCPCS | Performed by: FAMILY MEDICINE

## 2024-10-23 NOTE — TELEPHONE ENCOUNTER
Patient Quality Outreach    Patient is due for the following:   Diabetes -  Diabetic Follow-Up Visit  Physical Annual Wellness Visit    Next Steps:   Patient was scheduled for AWV  patient did not want to do the AWV today     Type of outreach:    Phone, spoke to patient/parent. patient      Questions for provider review:    None           Veronica Rodriguez MA

## 2024-10-23 NOTE — PROGRESS NOTES
"  Assessment & Plan     Type 2 diabetes mellitus without complication, without long-term current use of insulin (H)    Stable.    Continue metformin.    Return for AWV in January.      Mixed hyperlipidemia    Stable.      Thumb pain, left    No synovitis.    Avoid overuse.      The longitudinal plan of care for the diagnosis(es)/condition(s) as documented were addressed during this visit. Due to the added complexity in care, I will continue to support Dimitri in the subsequent management and with ongoing continuity of care.      Review of the result(s) of each unique test - A1c, BMP, lipids          BMI  Estimated body mass index is 26.19 kg/m  as calculated from the following:    Height as of this encounter: 1.672 m (5' 5.83\").    Weight as of this encounter: 73.2 kg (161 lb 6.4 oz).             Subjective   Dimitri is a 69 year old, presenting for the following health issues:  Diabetes      10/23/2024     7:41 AM   Additional Questions   Roomed by shakeel arroyo     History of Present Illness       Diabetes:   He presents for follow up of diabetes.  He is checking home blood glucose one time daily.   He checks blood glucose before meals.  Blood glucose is never over 200 and never under 70. He is aware of hypoglycemia symptoms including shakiness.    He has no concerns regarding his diabetes at this time.   He is not experiencing numbness or burning in feet, excessive thirst, blurry vision, weight changes or redness, sores or blisters on feet.           He eats 0-1 servings of fruits and vegetables daily.He consumes 0 sweetened beverage(s) daily.He exercises with enough effort to increase his heart rate 20 to 29 minutes per day.  He exercises with enough effort to increase his heart rate 7 days per week.   He is taking medications regularly.             Taking 2 metformin per day.    LDL was 54 on 10/21/24.  A1c was 5.9, had been 6.1  BMP was normal    Left thumb pain.  No injury.    Current Outpatient Medications " "  Medication Sig Dispense Refill    atorvastatin (LIPITOR) 40 MG tablet Take 1 tablet (40 mg) by mouth daily 90 tablet 3    blood glucose (ACCU-CHEK GUIDE) test strip Use to test blood sugar one time daily or as directed. 100 strip 3    blood glucose (NO BRAND SPECIFIED) lancets standard by In Vitro route daily Use to test blood sugar 3 times daily or as directed. 100 lancet. 2    blood glucose (NO BRAND SPECIFIED) lancets standard Use to test blood sugar 1 times daily or as directed. 100 each 3    blood glucose (NO BRAND SPECIFIED) lancing device Device to be used with lancets. 1 each 0    blood glucose (ONETOUCH ULTRA) test strip use 1 strip to test blood sugar 3 times daily or as directed. 100 strip 3    blood glucose (ONETOUCH VERIO IQ) test strip USE TO TEST BLOOD SUGAR 1 TIME DAILY OR AS DIRECTED 100 strip 4    blood glucose monitoring (ONE TOUCH ULTRA 2) meter device kit by In Vitro route daily Use to test blood sugar 1 (ONE) time daily. 1 kit 0    escitalopram (LEXAPRO) 20 MG tablet Take 1 tablet (20 mg) by mouth daily 90 tablet 3    Lancets (ONETOUCH DELICA PLUS [id number]) MISC Use to test blood sugar 3 times daily or as directed. 100 each 11    metFORMIN (GLUCOPHAGE) 500 MG tablet Take 2 tablets (1,000 mg) by mouth 2 times daily (with meals). 360 tablet 3    ASPIRIN NOT PRESCRIBED (INTENTIONAL) Please choose reason not prescribed from choices below. (Patient not taking: Reported on 10/23/2024)             Objective    /60 (BP Location: Left arm, Patient Position: Sitting, Cuff Size: Adult Regular)   Pulse 63   Temp 97.9  F (36.6  C) (Oral)   Resp 16   Ht 1.672 m (5' 5.83\")   Wt 73.2 kg (161 lb 6.4 oz)   SpO2 99%   BMI 26.19 kg/m    Body mass index is 26.19 kg/m .  Physical Exam     Heart normal  Lungs normal  Feet: normal monofilament  Left thumb: MCP tender.  Pain with extension.  No swelling or erythema.  Finkelstein's negative          Signed Electronically by: Richmond Caldwell MD    "

## 2024-11-03 PROBLEM — E78.2 MIXED HYPERLIPIDEMIA: Status: ACTIVE | Noted: 2024-11-03

## 2024-11-05 ENCOUNTER — TELEPHONE (OUTPATIENT)
Dept: FAMILY MEDICINE | Facility: CLINIC | Age: 69
End: 2024-11-05
Payer: COMMERCIAL

## 2024-11-05 NOTE — TELEPHONE ENCOUNTER
Can see me tomorrow 11/6/24 at 10:30 DB or 1:30 DB, otherwise Friday 11/8/24 at 11:30 DB   [facility]

## 2024-11-05 NOTE — TELEPHONE ENCOUNTER
Reason for Call:  Appointment Request    Patient requesting this type of appt: Pre-op    Requested provider: Richmond Caldwell    Reason patient unable to be scheduled: Not within requested timeframe    When does patient want to be seen/preferred time: 1-2 days    Comments: patient has surgery coming up on 11/15 and is needing a pre op - prefers to be seen by pcp     Okay to leave a detailed message?: Yes at Home number on file [phone] (home)    Call taken on 11/5/2024 at 11:56 AM by Dori Wolfe

## 2024-11-08 ENCOUNTER — OFFICE VISIT (OUTPATIENT)
Dept: FAMILY MEDICINE | Facility: CLINIC | Age: 69
End: 2024-11-08
Payer: COMMERCIAL

## 2024-11-08 VITALS
HEIGHT: 66 IN | OXYGEN SATURATION: 99 % | RESPIRATION RATE: 20 BRPM | WEIGHT: 162.04 LBS | DIASTOLIC BLOOD PRESSURE: 75 MMHG | HEART RATE: 59 BPM | BODY MASS INDEX: 26.04 KG/M2 | TEMPERATURE: 97.7 F | SYSTOLIC BLOOD PRESSURE: 131 MMHG

## 2024-11-08 DIAGNOSIS — F41.9 ANXIETY AND DEPRESSION: ICD-10-CM

## 2024-11-08 DIAGNOSIS — F32.A ANXIETY AND DEPRESSION: ICD-10-CM

## 2024-11-08 DIAGNOSIS — Z01.818 PREOPERATIVE EXAMINATION: Primary | ICD-10-CM

## 2024-11-08 DIAGNOSIS — H35.371 EPIRETINAL MEMBRANE (ERM) OF RIGHT EYE: ICD-10-CM

## 2024-11-08 PROCEDURE — 99214 OFFICE O/P EST MOD 30 MIN: CPT | Performed by: FAMILY MEDICINE

## 2024-11-08 RX ORDER — ESCITALOPRAM OXALATE 20 MG/1
20 TABLET ORAL DAILY
Qty: 90 TABLET | Refills: 3 | Status: SHIPPED | OUTPATIENT
Start: 2024-11-08

## 2024-11-08 NOTE — PROGRESS NOTES
Preoperative Evaluation  [address]  [place] [address]  Phone: [phone]  Fax: [phone]  Primary Provider: Richmond Caldwell MD  Pre-op Performing Provider: Richmond Caldwell MD  Nov 8, 2024 11/8/2024   Surgical Information   What procedure is being done? Right Vitrectomy    Facility or Hospital where procedure/surgery will be performed: [facility]    Who is doing the procedure / surgery? Dr Candelario    Date of surgery / procedure: 11/15/2024    Time of surgery / procedure: 1240    Where do you plan to recover after surgery? at home with family        Patient-reported     Fax number for surgical facility: [phone]    Assessment & Plan     The proposed surgical procedure is considered LOW risk.    Preoperative examination      Epiretinal membrane (ERM) of right eye      Anxiety and depression    Stable.    - escitalopram (LEXAPRO) 20 MG tablet  Dispense: 90 tablet; Refill: 3              - No identified additional risk factors other than previously addressed    Antiplatelet or Anticoagulation Medication Instructions   - Patient is on no antiplatelet or anticoagulation medications.    Additional Medication Instructions  Take all scheduled medications on the day of surgery EXCEPT for modifications listed below:   - metformin: DO NOT TAKE day of surgery.    Recommendation  Approval given to proceed with proposed procedure, without further diagnostic evaluation.    Alon Mosley is a 69 year old, presenting for the following:  Pre-Op Exam          11/8/2024    10:55 AM   Additional Questions   Roomed by paw p   Accompanied by self         11/8/2024   Forms   Any forms needing to be completed Yes        HPI related to upcoming procedure: Epiretinal membrane, posterior vitreal detachment.  Diabetes, controlled.        11/8/2024   Pre-Op Questionnaire   Have you ever had a heart attack or stroke? No    Have you ever had  surgery on your heart or blood vessels, such as a stent placement, a coronary artery bypass, or surgery on an artery in your head, neck, heart, or legs? No    Do you have chest pain with activity? No    Do you have a history of heart failure? No    Do you currently have a cold, bronchitis or symptoms of other infection? No    Do you have a cough, shortness of breath, or wheezing? No    Do you or anyone in your family have previous history of blood clots? No    Do you or does anyone in your family have a serious bleeding problem such as prolonged bleeding following surgeries or cuts? No    Have you ever had problems with anemia or been told to take iron pills? No    Have you had any abnormal blood loss such as black, tarry or bloody stools? No    Have you ever had a blood transfusion? No    Are you willing to have a blood transfusion if it is medically needed before, during, or after your surgery? Yes    Have you or any of your relatives ever had problems with anesthesia? No    Do you have sleep apnea, excessive snoring or daytime drowsiness? No    Do you have any artifical heart valves or other implanted medical devices like a pacemaker, defibrillator, or continuous glucose monitor? No    Do you have artificial joints? No    Are you allergic to latex? No        Patient-reported         Preoperative Review of    reviewed - no record of controlled substances prescribed.          Patient Active Problem List    Diagnosis Date Noted    Mixed hyperlipidemia 11/03/2024     Priority: Medium    Heart murmur 05/16/2022     Priority: Medium    Hypercholesteremia      Priority: Medium     Created by Conversion        Type 2 diabetes mellitus without complication (H) 08/10/2016     Priority: Medium    Coronary Artery Disease      Priority: Medium     Created by Conversion  [facility] Annotation: Apr 2 2012  2:52PM - Jai Robles: 30% Proximal   LAD  Replacement Utility updated for latest IMO load        Diverticulosis       Priority: Medium     Created by Conversion  Replacement Utility updated for latest IMO load        Osteoarthritis 02/12/2015     Priority: Medium     R knee.        Hyperglycemia      Priority: Medium     Created by Conversion        Benign Polyps Of The Large Intestine      Priority: Medium     Created by Conversion        Obesity      Priority: Medium     Created by Conversion        Obstructive Sleep Apnea      Priority: Medium     Created by Conversion  [facility] Annotation: Jul 7 2009  7:49AM - Jai Robles: On CPAP          No past medical history on file.  Past Surgical History:   Procedure Laterality Date    HC REMOVE TONSILS/ADENOIDS,<13 Y/O      Description: Tonsillectomy With Adenoidectomy;  Recorded: 01/29/2009;    HC REPAIR OF NASAL SEPTUM      Description: Septoplasty;  Recorded: 01/29/2009;     Current Outpatient Medications   Medication Sig Dispense Refill    atorvastatin (LIPITOR) 40 MG tablet Take 1 tablet (40 mg) by mouth daily 90 tablet 3    blood glucose (ACCU-CHEK GUIDE) test strip Use to test blood sugar one time daily or as directed. 100 strip 3    blood glucose (NO BRAND SPECIFIED) lancets standard by In Vitro route daily Use to test blood sugar 3 times daily or as directed. 100 lancet. 2    blood glucose (NO BRAND SPECIFIED) lancets standard Use to test blood sugar 1 times daily or as directed. 100 each 3    blood glucose (NO BRAND SPECIFIED) lancing device Device to be used with lancets. 1 each 0    blood glucose (ONETOUCH ULTRA) test strip use 1 strip to test blood sugar 3 times daily or as directed. 100 strip 3    blood glucose (ONETOUCH VERIO IQ) test strip USE TO TEST BLOOD SUGAR 1 TIME DAILY OR AS DIRECTED 100 strip 4    blood glucose monitoring (ONE TOUCH ULTRA 2) meter device kit by In Vitro route daily Use to test blood sugar 1 (ONE) time daily. 1 kit 0    escitalopram (LEXAPRO) 20 MG tablet Take 1 tablet (20 mg) by mouth daily 90 tablet 3    Lancets (ONETOUCH DELICA PLUS  "[id number]) MISC Use to test blood sugar 3 times daily or as directed. 100 each 11    metFORMIN (GLUCOPHAGE) 500 MG tablet Take 2 tablets (1,000 mg) by mouth 2 times daily (with meals). 360 tablet 3    ASPIRIN NOT PRESCRIBED (INTENTIONAL) Please choose reason not prescribed from choices below. (Patient not taking: Reported on 12/15/2023)         No Known Allergies     Social History     Tobacco Use    Smoking status: Former     Current packs/day: 0.00     Average packs/day: 0.3 packs/day for 40.0 years (10.0 ttl pk-yrs)     Types: Cigarettes     Start date: 5/10/1979     Quit date: 5/10/2019     Years since quittin.5     Passive exposure: Never    Smokeless tobacco: Never    Tobacco comments:     1 pack per week, wife smokes outside   Substance Use Topics    Alcohol use: Yes     Alcohol/week: 0.0 standard drinks of alcohol     Comment: Alcoholic Drinks/day: \"some...... weekends\"       History   Drug Use No               Objective    /75   Pulse 59   Temp 97.7  F (36.5  C) (Oral)   Resp 20   Ht 1.665 m (5' 5.55\")   Wt 73.5 kg (162 lb 0.6 oz)   SpO2 99%   BMI 26.51 kg/m     Estimated body mass index is 26.51 kg/m  as calculated from the following:    Height as of this encounter: 1.665 m (5' 5.55\").    Weight as of this encounter: 73.5 kg (162 lb 0.6 oz).  Physical Exam  Eyes: EOM full, pupils normal, conjunctivae normal  Ears: TM's and canals normal  Oropharynx: normal  Neck: supple without adenopathy or thyromegaly  Lungs: normal  Heart: regular rhythm, normal rate, no murmur  Abdomen: no HSM, mass or tenderness  Extremities: FROM, normal strength and sensation      Recent Labs   Lab Test 10/21/24  0731 24  0712    144   POTASSIUM 4.7 4.6   CR 1.08 1.06   A1C 5.9* 6.1*        Diagnostics  Recent Results (from the past 720 hours)   Hemoglobin A1c    Collection Time: 10/21/24  7:31 AM   Result Value Ref Range    Estimated Average Glucose 123 (H) <117 mg/dL    Hemoglobin A1C 5.9 (H) 0.0 - 5.6 " %   Basic metabolic panel  (Ca, Cl, CO2, Creat, Gluc, K, Na, BUN)    Collection Time: 10/21/24  7:31 AM   Result Value Ref Range    Sodium 142 135 - 145 mmol/L    Potassium 4.7 3.4 - 5.3 mmol/L    Chloride 107 98 - 107 mmol/L    Carbon Dioxide (CO2) 25 22 - 29 mmol/L    Anion Gap 10 7 - 15 mmol/L    Urea Nitrogen 17.3 8.0 - 23.0 mg/dL    Creatinine 1.08 0.67 - 1.17 mg/dL    GFR Estimate 73 >60 mL/min/1.73m2    Calcium 10.0 8.8 - 10.4 mg/dL    Glucose 121 (H) 70 - 99 mg/dL    Patient Fasting > 8hrs? Yes    Lipid panel reflex to direct LDL Fasting    Collection Time: 10/21/24  7:31 AM   Result Value Ref Range    Cholesterol 117 <200 mg/dL    Triglycerides 68 <150 mg/dL    Direct Measure HDL 49 >=40 mg/dL    LDL Cholesterol Calculated 54 <100 mg/dL    Non HDL Cholesterol 68 <130 mg/dL    Patient Fasting > 8hrs? Yes    Albumin Random Urine Quantitative with Creat Ratio    Collection Time: 10/21/24  8:47 AM   Result Value Ref Range    Creatinine Urine mg/dL 53.4 mg/dL    Albumin Urine mg/L <12.0 mg/L    Albumin Urine mg/g Cr        No EKG required for low risk surgery (cataract, skin procedure, breast biopsy, etc).    Revised Cardiac Risk Index (RCRI)  The patient has the following serious cardiovascular risks for perioperative complications:   - No serious cardiac risks = 0 points     RCRI Interpretation: 0 points: Class I (very low risk - 0.4% complication rate)         Signed Electronically by: Richmond Caldwell MD  A copy of this evaluation report is provided to the requesting physician.

## 2024-12-19 ENCOUNTER — TELEPHONE (OUTPATIENT)
Dept: FAMILY MEDICINE | Facility: CLINIC | Age: 69
End: 2024-12-19
Payer: COMMERCIAL

## 2024-12-19 DIAGNOSIS — E11.9 TYPE 2 DIABETES MELLITUS WITHOUT COMPLICATION, WITHOUT LONG-TERM CURRENT USE OF INSULIN (H): ICD-10-CM

## 2024-12-19 NOTE — TELEPHONE ENCOUNTER
Pharmacy called and states that patient is requesting if provider can write a new RX to replace the current one. If provider willing to write it so patient takes the 1000 MG tablet instead of the 500 MG tablet, so he does not have to take two tablet at once. He would rather just take 1 tablet each time he has to take it.     Routing to PCP for review and send new RX if approved.     Pharmacy:  [id number] - [place], [place] - [address], MSN, RN   [facility]

## 2025-01-28 ENCOUNTER — OFFICE VISIT (OUTPATIENT)
Dept: FAMILY MEDICINE | Facility: CLINIC | Age: 70
End: 2025-01-28
Payer: COMMERCIAL

## 2025-01-28 VITALS
HEIGHT: 66 IN | BODY MASS INDEX: 26.12 KG/M2 | RESPIRATION RATE: 16 BRPM | SYSTOLIC BLOOD PRESSURE: 139 MMHG | OXYGEN SATURATION: 99 % | TEMPERATURE: 97.9 F | WEIGHT: 162.5 LBS | HEART RATE: 60 BPM | DIASTOLIC BLOOD PRESSURE: 82 MMHG

## 2025-01-28 DIAGNOSIS — Z00.00 ROUTINE GENERAL MEDICAL EXAMINATION AT A HEALTH CARE FACILITY: Primary | ICD-10-CM

## 2025-01-28 DIAGNOSIS — E11.9 TYPE 2 DIABETES MELLITUS WITHOUT COMPLICATION, WITHOUT LONG-TERM CURRENT USE OF INSULIN (H): ICD-10-CM

## 2025-01-28 PROCEDURE — G0439 PPPS, SUBSEQ VISIT: HCPCS | Performed by: FAMILY MEDICINE

## 2025-01-28 SDOH — HEALTH STABILITY: PHYSICAL HEALTH: ON AVERAGE, HOW MANY DAYS PER WEEK DO YOU ENGAGE IN MODERATE TO STRENUOUS EXERCISE (LIKE A BRISK WALK)?: 2 DAYS

## 2025-01-28 ASSESSMENT — SOCIAL DETERMINANTS OF HEALTH (SDOH): HOW OFTEN DO YOU GET TOGETHER WITH FRIENDS OR RELATIVES?: ONCE A WEEK

## 2025-01-28 NOTE — PROGRESS NOTES
"Preventive Care Visit  [facility] MG Caldwell MD, Family Medicine  Jan 28, 2025      Assessment & Plan     Routine general medical examination at a health care facility    Request staff to assist pt to schedule colonoscopy.      Type 2 diabetes mellitus without complication, without long-term current use of insulin (H)    A1c was 5.9 in October.    Recheck in October.    - PRIMARY CARE FOLLOW-UP SCHEDULING              BMI  Estimated body mass index is 26.12 kg/m  as calculated from the following:    Height as of this encounter: 1.68 m (5' 6.14\").    Weight as of this encounter: 73.7 kg (162 lb 8 oz).       Counseling  Appropriate preventive services were addressed with this patient via screening, questionnaire, or discussion as appropriate for fall prevention, nutrition, physical activity, Tobacco-use cessation, social engagement, weight loss and cognition.  Checklist reviewing preventive services available has been given to the patient.  Reviewed patient's diet, addressing concerns and/or questions.   He is at risk for lack of exercise and has been provided with information to increase physical activity for the benefit of his well-being.           Alon Mosley is a 69 year old, presenting for the following:  Wellness Visit        1/28/2025     8:47 AM   Additional Questions   Roomed by Ebony Gonzáles x 1    Going to [place] this summer.            Health Care Directive  Patient does not have a Health Care Directive: Discussed advance care planning with patient; information given to patient to review.      1/28/2025   General Health   How would you rate your overall physical health? Excellent   Feel stress (tense, anxious, or unable to sleep) Only a little   (!) STRESS CONCERN      1/28/2025   Nutrition   Diet: Low salt    Low fat/cholesterol    Diabetic       Multiple values from one day are sorted in reverse-chronological order         1/28/2025   Exercise "   Days per week of moderate/strenous exercise 2 days   (!) EXERCISE CONCERN      1/28/2025   Social Factors   Frequency of gathering with friends or relatives Once a week   Worry food won't last until get money to buy more No   Food not last or not have enough money for food? No   Do you have housing? (Housing is defined as stable permanent housing and does not include staying ouside in a car, in a tent, in an abandoned building, in an overnight shelter, or couch-surfing.) Yes   Are you worried about losing your housing? No   Lack of transportation? No   Unable to get utilities (heat,electricity)? No         1/28/2025   Fall Risk   Fallen 2 or more times in the past year? No   Trouble with walking or balance? No          1/28/2025   Activities of Daily Living- Home Safety   Needs help with the following daily activites None of the above   Safety concerns in the home None of the above         1/28/2025   Dental   Dentist two times every year? Yes         1/28/2025   Hearing Screening   Hearing concerns? None of the above         1/28/2025   Driving Risk Screening   Patient/family members have concerns about driving No         1/28/2025   General Alertness/Fatigue Screening   Have you been more tired than usual lately? No         1/28/2025   Urinary Incontinence Screening   Bothered by leaking urine in past 6 months No         1/28/2025   TB Screening   Were you born outside of the [place]? No         Today's PHQ-2 Score:       1/28/2025     8:49 AM   PHQ-2 ( 1999 [employer])   Q1: Little interest or pleasure in doing things 0    Q2: Feeling down, depressed or hopeless 0    PHQ-2 Score 0    Q1: Little interest or pleasure in doing things Not at all   Q2: Feeling down, depressed or hopeless Not at all   PHQ-2 Score 0       Proxy-reported           1/28/2025   Substance Use   Alcohol more than 3/day or more than 7/wk Not Applicable   Do you have a current opioid prescription? No   How severe/bad is pain from 1 to 10? 0/10 (No  "Pain)   Do you use any other substances recreationally? No     Social History     Tobacco Use    Smoking status: Former     Current packs/day: 0.00     Average packs/day: 0.3 packs/day for 40.0 years (10.0 ttl pk-yrs)     Types: Cigarettes     Start date: 5/10/1979     Quit date: 5/10/2019     Years since quittin.7     Passive exposure: Never    Smokeless tobacco: Never    Tobacco comments:     1 pack per week, wife smokes outside   Vaping Use    Vaping status: Never Used   Substance Use Topics    Alcohol use: Yes     Alcohol/week: 0.0 standard drinks of alcohol     Comment: Alcoholic Drinks/day: \"some...... weekends\"    Drug use: No       ASCVD Risk   The ASCVD Risk score (Gene DK, et al., 2019) failed to calculate for the following reasons:    The valid total cholesterol range is 130 to 320 mg/dL            Reviewed and updated as needed this visit by Provider                      Current providers sharing in care for this patient include:  Patient Care Team:  Richmond Caldwell MD as PCP - General (Family Medicine)  Pooja Boyce, PharmD as Pharmacist (Pharmacist)  Caridad Murray AuD as Audiologist (Audiology)  Richmond Caldwell MD as Assigned PCP    The following health maintenance items are reviewed in Epic and correct as of today:  Health Maintenance   Topic Date Due    ZOSTER IMMUNIZATION (1 of 2) Never done    LUNG CANCER SCREENING  Never done    RSV VACCINE (1 - Risk 60-74 years 1-dose series) Never done    AORTIC ANEURYSM SCREENING (SYSTEM ASSIGNED)  Never done    DIABETIC FOOT EXAM  2023    ANNUAL REVIEW OF HM ORDERS  2024    DTAP/TDAP/TD IMMUNIZATION (3 - Td or Tdap) 2024    COLORECTAL CANCER SCREENING  2024    INFLUENZA VACCINE (1) Never done    COVID-19 Vaccine ( -  season) 2024    MEDICARE ANNUAL WELLNESS VISIT  12/15/2024    A1C  2025    EYE EXAM  10/10/2025    BMP  10/21/2025    LIPID  10/21/2025    MICROALBUMIN  10/21/2025    FALL " "RISK ASSESSMENT  01/28/2026    ADVANCE CARE PLANNING  12/15/2028    HEPATITIS C SCREENING  Completed    PHQ-2 (once per calendar year)  Completed    Pneumococcal Vaccine: 50+ Years  Completed    HPV IMMUNIZATION  Aged Out    MENINGITIS IMMUNIZATION  Aged Out    RSV MONOCLONAL ANTIBODY  Aged Out     Current Outpatient Medications   Medication Sig Dispense Refill    atorvastatin (LIPITOR) 40 MG tablet Take 1 tablet (40 mg) by mouth daily 90 tablet 3    blood glucose (ACCU-CHEK GUIDE) test strip Use to test blood sugar one time daily or as directed. 100 strip 3    blood glucose (NO BRAND SPECIFIED) lancets standard by In Vitro route daily Use to test blood sugar 3 times daily or as directed. 100 lancet. 2    blood glucose (NO BRAND SPECIFIED) lancets standard Use to test blood sugar 1 times daily or as directed. 100 each 3    blood glucose (NO BRAND SPECIFIED) lancing device Device to be used with lancets. 1 each 0    blood glucose (ONETOUCH ULTRA) test strip use 1 strip to test blood sugar 3 times daily or as directed. 100 strip 3    blood glucose (ONETOUCH VERIO IQ) test strip USE TO TEST BLOOD SUGAR 1 TIME DAILY OR AS DIRECTED 100 strip 4    blood glucose monitoring (ONE TOUCH ULTRA 2) meter device kit by In Vitro route daily Use to test blood sugar 1 (ONE) time daily. 1 kit 0    escitalopram (LEXAPRO) 20 MG tablet Take 1 tablet (20 mg) by mouth daily. 90 tablet 3    Lancets (ONETOUCH DELICA PLUS [id number]) MISC Use to test blood sugar 3 times daily or as directed. 100 each 11    metFORMIN (GLUCOPHAGE) 1000 MG tablet Take 1 tablet (1,000 mg) by mouth 2 times daily (with meals). 90 tablet 0    ASPIRIN NOT PRESCRIBED (INTENTIONAL) Please choose reason not prescribed from choices below. (Patient not taking: Reported on 1/28/2025)            Objective    Exam  /82   Pulse 60   Temp 97.9  F (36.6  C) (Oral)   Resp 16   Ht 1.68 m (5' 6.14\")   Wt 73.7 kg (162 lb 8 oz)   SpO2 99%   BMI 26.12 kg/m     Estimated " "body mass index is 26.12 kg/m  as calculated from the following:    Height as of this encounter: 1.68 m (5' 6.14\").    Weight as of this encounter: 73.7 kg (162 lb 8 oz).    Physical Exam    Eyes: EOM full, pupils normal, conjunctivae normal  Ears: TM's and canals normal  Oropharynx: normal  Neck: supple without adenopathy or thyromegaly  Lungs: normal  Heart: regular rhythm, normal rate, no murmur  Abdomen: no HSM, mass or tenderness  Pt declined /TIMBO  Extremities: FROM, normal strength and sensation        1/28/2025   Mini Cog   Clock Draw Score 2 Normal   3 Item Recall 2 objects recalled   Mini Cog Total Score 4              Signed Electronically by: Richmond Caldwell MD    "

## 2025-01-30 ENCOUNTER — TELEPHONE (OUTPATIENT)
Dept: FAMILY MEDICINE | Facility: CLINIC | Age: 70
End: 2025-01-30
Payer: COMMERCIAL

## 2025-02-01 DIAGNOSIS — E11.9 TYPE 2 DIABETES MELLITUS WITHOUT COMPLICATION, WITHOUT LONG-TERM CURRENT USE OF INSULIN (H): ICD-10-CM

## 2025-02-10 ENCOUNTER — TELEPHONE (OUTPATIENT)
Dept: CARDIOLOGY | Facility: CLINIC | Age: 70
End: 2025-02-10
Payer: COMMERCIAL

## 2025-02-10 DIAGNOSIS — E78.00 HYPERCHOLESTEREMIA: Primary | ICD-10-CM

## 2025-02-10 NOTE — TELEPHONE ENCOUNTER
"Received a voicemail from patient wondering he should see LBF and how it has \"been awhile\". Noted pt last seen in 2022 and was due for 2 year follow up in 2024. Order placed for follow up and sent to schedulers to arrange. -[facility]  "

## 2025-02-10 NOTE — TELEPHONE ENCOUNTER
Spoke to patient and he will call to schedule when ready.   [facility] Gastro[facility] scheduling line [phone]    Closing encounter.

## 2025-02-26 ENCOUNTER — OFFICE VISIT (OUTPATIENT)
Dept: CARDIOLOGY | Facility: CLINIC | Age: 70
End: 2025-02-26
Attending: INTERNAL MEDICINE
Payer: COMMERCIAL

## 2025-02-26 VITALS
WEIGHT: 162 LBS | DIASTOLIC BLOOD PRESSURE: 66 MMHG | HEART RATE: 60 BPM | RESPIRATION RATE: 16 BRPM | SYSTOLIC BLOOD PRESSURE: 138 MMHG | BODY MASS INDEX: 26.04 KG/M2

## 2025-02-26 DIAGNOSIS — I35.0 NONRHEUMATIC AORTIC VALVE STENOSIS: ICD-10-CM

## 2025-02-26 DIAGNOSIS — E11.9 TYPE 2 DIABETES MELLITUS WITHOUT COMPLICATION, WITHOUT LONG-TERM CURRENT USE OF INSULIN (H): ICD-10-CM

## 2025-02-26 DIAGNOSIS — I25.83 CORONARY ATHEROSCLEROSIS DUE TO LIPID RICH PLAQUE: Primary | ICD-10-CM

## 2025-02-26 DIAGNOSIS — G47.33 OBSTRUCTIVE SLEEP APNEA (ADULT) (PEDIATRIC): ICD-10-CM

## 2025-02-26 DIAGNOSIS — E78.00 HYPERCHOLESTEREMIA: ICD-10-CM

## 2025-02-26 PROBLEM — R01.1 HEART MURMUR: Status: RESOLVED | Noted: 2022-05-16 | Resolved: 2025-02-26

## 2025-02-26 PROBLEM — E78.2 MIXED HYPERLIPIDEMIA: Status: RESOLVED | Noted: 2024-11-03 | Resolved: 2025-02-26

## 2025-02-26 PROCEDURE — 99214 OFFICE O/P EST MOD 30 MIN: CPT | Performed by: INTERNAL MEDICINE

## 2025-02-26 PROCEDURE — G2211 COMPLEX E/M VISIT ADD ON: HCPCS | Performed by: INTERNAL MEDICINE

## 2025-02-26 PROCEDURE — 3078F DIAST BP <80 MM HG: CPT | Performed by: INTERNAL MEDICINE

## 2025-02-26 PROCEDURE — 3075F SYST BP GE 130 - 139MM HG: CPT | Performed by: INTERNAL MEDICINE

## 2025-02-26 NOTE — PROGRESS NOTES
[facility]  Heart Care Clinic Follow-up Note    Assessment & Plan        (I25.10,  I25.83) Coronary atherosclerosis due to lipid rich plaque  (primary encounter diagnosis)  Comment: Minimal coronary artery disease.  He underwent invasive angiogram August 2011 that showed only mild disease in the left anterior descending midportion.  He underwent repeat CT angiogram in 2013 which showed normal left main and insignificant left anterior descending disease.  The circumflex and right coronary artery were normal.  He had stress nuclear in 2019 which was normal as well.  Given recent episodes of vague chest discomfort lasting a minute will arrange for repeat coronary CTA, if not covered we will then arrange for stress echo.    (I35.0) Nonrheumatic aortic valve stenosis  Comment: Based on echo mild with sclerosis from 2022, recheck echo now.     (E78.00) Hypercholesteremia  Comment: Cholesterol excellent at 117 with an LDL of 54.  Continue current atorvastatin.     (E11.9) Type 2 diabetes mellitus without complication, without long-term current use of insulin (H)  Comment: Lost weight and hemoglobin A1c 5.9.     (G47.33) Obstructive Sleep Apnea  Comment: Resolved with weight loss and never had CPAP.    Plan  1.  Given aortic stenosis will recheck echocardiogram.  2.  Given vague chest discomfort will recheck coronary CTA.  3.  Follow with me in 2 years or sooner if needed.    The longitudinal plan of care for the diagnosis(es)/condition(s) as documented were addressed during this visit. Due to the added complexity in care, I will continue to support Dimitri in the subsequent management and with ongoing continuity of care.     Subjective  CC: 69-year-old white gentleman here for 2-year follow-up.  Since I seen him he had lens implants placed and removed, has had squamous cell carcinoma involving the scalp, and tells me over the last month he has had several arguments with his grandson causing him to have chest  tightness lasting just a minute daily for a week.  Otherwise he is physically active without effort related angina, effort related shortness of breath, PND, orthopnea, syncope, dizziness or peripheral edema.    Medications  Current Outpatient Medications   Medication Sig Dispense Refill    atorvastatin (LIPITOR) 40 MG tablet Take 1 tablet (40 mg) by mouth daily 90 tablet 3    blood glucose (ONETOUCH ULTRA) test strip use 1 strip to test blood sugar 3 times daily or as directed. 100 strip 3    blood glucose monitoring (ONE TOUCH ULTRA 2) meter device kit by In Vitro route daily Use to test blood sugar 1 (ONE) time daily. 1 kit 0    escitalopram (LEXAPRO) 20 MG tablet Take 1 tablet (20 mg) by mouth daily. 90 tablet 3    Lancets (ONETOUCH DELICA PLUS [id number]) MISC Use to test blood sugar 3 times daily or as directed. 100 each 11    metFORMIN (GLUCOPHAGE) 1000 MG tablet Take 1 tablet (1,000 mg) by mouth 2 times daily with meals. 180 tablet 0    ASPIRIN NOT PRESCRIBED (INTENTIONAL) Please choose reason not prescribed from choices below. (Patient not taking: Reported on 12/15/2023)      blood glucose (ACCU-CHEK GUIDE) test strip Use to test blood sugar one time daily or as directed. 100 strip 3    blood glucose (NO BRAND SPECIFIED) lancets standard by In Vitro route daily Use to test blood sugar 3 times daily or as directed. 100 lancet. 2    blood glucose (NO BRAND SPECIFIED) lancing device Device to be used with lancets. 1 each 0       Objective  /66 (BP Location: Left arm, Patient Position: Sitting, Cuff Size: Adult Regular)   Pulse 60   Resp 16   Wt 73.5 kg (162 lb)   BMI 26.04 kg/m      General Appearance:    Alert, cooperative, no distress, appears stated age   Head:    Normocephalic, without obvious abnormality, atraumatic   Throat:   Lips, mucosa, and tongue normal; teeth and gums normal   Neck:   Supple, symmetrical, trachea midline, no adenopathy;        thyroid:  No enlargement/tenderness/nodules; no  "carotid    bruit or JVD   Back:     Symmetric, no curvature, ROM normal, no CVA tenderness   Lungs:     Clear to auscultation bilaterally, respirations unlabored   Chest wall:    No tenderness or deformity   Heart:    Regular rate and rhythm, S1 and S2 normal, 2/6 early peaking systolic ejection murmur, no rub   or gallop   Abdomen:     Soft, non-tender, bowel sounds active all four quadrants,     no masses, no organomegaly   Extremities:   Normal, atraumatic, no cyanosis or edema   Pulses:   2+ and symmetric all extremities   Skin:   Skin color, texture, turgor normal, no rashes or lesions     Results    Lab Results personally reviewed   Lab Results   Component Value Date    CHOL 117 10/21/2024    CHOL 90 04/26/2024     Lab Results   Component Value Date    HDL 49 10/21/2024    HDL 40 04/26/2024     No components found for: \"LDLCALC\"  Lab Results   Component Value Date    TRIG 68 10/21/2024    TRIG 52 04/26/2024     Lab Results   Component Value Date    WBC 7.8 04/14/2021    HGB 14.7 04/14/2021    HCT 45.0 04/14/2021     04/14/2021     Lab Results   Component Value Date    BUN 17.3 10/21/2024     10/21/2024    CO2 25 10/21/2024           "

## 2025-02-26 NOTE — PATIENT INSTRUCTIONS
Dimitri DEV Almaraz,  I enjoyed visiting with you again today.  I am glad to hear you are doing well.  Per our conversation let us check the echo given the murmur which is the stiff aortic valve.  Given the chest issue let us repeat the cat scan and if not covered will get the stress test.  I will plan on seeing you 2 years.  Chin Suarez

## 2025-02-26 NOTE — LETTER
2/26/2025    Richmond Caldwell MD, MD  [address] [place] [address] 1  [place] [address]    RE: Dimitri MÉNDEZ Sung       Dear Colleague,     I had the pleasure of seeing Dimitri MÉNDEZ Sung in the [facility].      [facility]  Heart Care Clinic Follow-up Note    Assessment & Plan        (I25.10,  I25.83) Coronary atherosclerosis due to lipid rich plaque  (primary encounter diagnosis)  Comment: Minimal coronary artery disease.  He underwent invasive angiogram August 2011 that showed only mild disease in the left anterior descending midportion.  He underwent repeat CT angiogram in 2013 which showed normal left main and insignificant left anterior descending disease.  The circumflex and right coronary artery were normal.  He had stress nuclear in 2019 which was normal as well.  Given recent episodes of vague chest discomfort lasting a minute will arrange for repeat coronary CTA, if not covered we will then arrange for stress echo.    (I35.0) Nonrheumatic aortic valve stenosis  Comment: Based on echo mild with sclerosis from 2022, recheck echo now.     (E78.00) Hypercholesteremia  Comment: Cholesterol excellent at 117 with an LDL of 54.  Continue current atorvastatin.     (E11.9) Type 2 diabetes mellitus without complication, without long-term current use of insulin (H)  Comment: Lost weight and hemoglobin A1c 5.9.     (G47.33) Obstructive Sleep Apnea  Comment: Resolved with weight loss and never had CPAP.    Plan  1.  Given aortic stenosis will recheck echocardiogram.  2.  Given vague chest discomfort will recheck coronary CTA.  3.  Follow with me in 2 years or sooner if needed.    The longitudinal plan of care for the diagnosis(es)/condition(s) as documented were addressed during this visit. Due to the added complexity in care, I will continue to support Dimitri in the subsequent management and with ongoing continuity of care.     Subjective  CC: 69-year-old white gentleman here for 2-year follow-up.   Since I seen him he had lens implants placed and removed, has had squamous cell carcinoma involving the scalp, and tells me over the last month he has had several arguments with his grandson causing him to have chest tightness lasting just a minute daily for a week.  Otherwise he is physically active without effort related angina, effort related shortness of breath, PND, orthopnea, syncope, dizziness or peripheral edema.    Medications  Current Outpatient Medications   Medication Sig Dispense Refill     atorvastatin (LIPITOR) 40 MG tablet Take 1 tablet (40 mg) by mouth daily 90 tablet 3     blood glucose (ONETOUCH ULTRA) test strip use 1 strip to test blood sugar 3 times daily or as directed. 100 strip 3     blood glucose monitoring (ONE TOUCH ULTRA 2) meter device kit by In Vitro route daily Use to test blood sugar 1 (ONE) time daily. 1 kit 0     escitalopram (LEXAPRO) 20 MG tablet Take 1 tablet (20 mg) by mouth daily. 90 tablet 3     Lancets (ONETOUCH DELICA PLUS [id number]) MISC Use to test blood sugar 3 times daily or as directed. 100 each 11     metFORMIN (GLUCOPHAGE) 1000 MG tablet Take 1 tablet (1,000 mg) by mouth 2 times daily with meals. 180 tablet 0     ASPIRIN NOT PRESCRIBED (INTENTIONAL) Please choose reason not prescribed from choices below. (Patient not taking: Reported on 12/15/2023)       blood glucose (ACCU-CHEK GUIDE) test strip Use to test blood sugar one time daily or as directed. 100 strip 3     blood glucose (NO BRAND SPECIFIED) lancets standard by In Vitro route daily Use to test blood sugar 3 times daily or as directed. 100 lancet. 2     blood glucose (NO BRAND SPECIFIED) lancing device Device to be used with lancets. 1 each 0       Objective  /66 (BP Location: Left arm, Patient Position: Sitting, Cuff Size: Adult Regular)   Pulse 60   Resp 16   Wt 73.5 kg (162 lb)   BMI 26.04 kg/m      General Appearance:    Alert, cooperative, no distress, appears stated age   Head:     "Normocephalic, without obvious abnormality, atraumatic   Throat:   Lips, mucosa, and tongue normal; teeth and gums normal   Neck:   Supple, symmetrical, trachea midline, no adenopathy;        thyroid:  No enlargement/tenderness/nodules; no carotid    bruit or JVD   Back:     Symmetric, no curvature, ROM normal, no CVA tenderness   Lungs:     Clear to auscultation bilaterally, respirations unlabored   Chest wall:    No tenderness or deformity   Heart:    Regular rate and rhythm, S1 and S2 normal, 2/6 early peaking systolic ejection murmur, no rub   or gallop   Abdomen:     Soft, non-tender, bowel sounds active all four quadrants,     no masses, no organomegaly   Extremities:   Normal, atraumatic, no cyanosis or edema   Pulses:   2+ and symmetric all extremities   Skin:   Skin color, texture, turgor normal, no rashes or lesions     Results    Lab Results personally reviewed   Lab Results   Component Value Date    CHOL 117 10/21/2024    CHOL 90 04/26/2024     Lab Results   Component Value Date    HDL 49 10/21/2024    HDL 40 04/26/2024     No components found for: \"LDLCALC\"  Lab Results   Component Value Date    TRIG 68 10/21/2024    TRIG 52 04/26/2024     Lab Results   Component Value Date    WBC 7.8 04/14/2021    HGB 14.7 04/14/2021    HCT 45.0 04/14/2021     04/14/2021     Lab Results   Component Value Date    BUN 17.3 10/21/2024     10/21/2024    CO2 25 10/21/2024               Thank you for allowing me to participate in the care of your patient.      Sincerely,     KOKO SUAREZ MD     [facility]  cc:   Angie Suarez MD  [address], [address]  [place] [address]      "

## 2025-03-11 RX ORDER — DILTIAZEM HYDROCHLORIDE 5 MG/ML
10-15 INJECTION INTRAVENOUS
Status: DISCONTINUED | OUTPATIENT
Start: 2025-03-11 | End: 2025-03-27 | Stop reason: HOSPADM

## 2025-03-11 RX ORDER — NITROGLYCERIN 0.4 MG/1
0.4 TABLET SUBLINGUAL
Status: DISCONTINUED | OUTPATIENT
Start: 2025-03-11 | End: 2025-03-27 | Stop reason: HOSPADM

## 2025-03-11 RX ORDER — METOPROLOL TARTRATE 1 MG/ML
5-20 INJECTION, SOLUTION INTRAVENOUS
Status: DISCONTINUED | OUTPATIENT
Start: 2025-03-11 | End: 2025-03-27 | Stop reason: HOSPADM

## 2025-03-26 ENCOUNTER — ANCILLARY ORDERS (OUTPATIENT)
Dept: CARDIOLOGY | Facility: CLINIC | Age: 70
End: 2025-03-26

## 2025-03-26 ENCOUNTER — HOSPITAL ENCOUNTER (OUTPATIENT)
Dept: CT IMAGING | Facility: CLINIC | Age: 70
Discharge: HOME | End: 2025-03-26
Attending: INTERNAL MEDICINE
Payer: COMMERCIAL

## 2025-03-26 ENCOUNTER — HOSPITAL ENCOUNTER (OUTPATIENT)
Dept: CARDIOLOGY | Facility: CLINIC | Age: 70
Discharge: HOME | End: 2025-03-26
Attending: INTERNAL MEDICINE
Payer: COMMERCIAL

## 2025-03-26 VITALS — HEART RATE: 65 BPM | DIASTOLIC BLOOD PRESSURE: 60 MMHG | SYSTOLIC BLOOD PRESSURE: 117 MMHG

## 2025-03-26 DIAGNOSIS — I35.0 NONRHEUMATIC AORTIC VALVE STENOSIS: Primary | ICD-10-CM

## 2025-03-26 DIAGNOSIS — R93.1 ABNORMAL FINDINGS ON DIAGNOSTIC IMAGING OF HEART AND CORONARY CIRCULATION: ICD-10-CM

## 2025-03-26 DIAGNOSIS — I35.0 NONRHEUMATIC AORTIC VALVE STENOSIS: ICD-10-CM

## 2025-03-26 DIAGNOSIS — I25.83 CORONARY ATHEROSCLEROSIS DUE TO LIPID RICH PLAQUE: ICD-10-CM

## 2025-03-26 LAB
BSA FOR ECHO PROCEDURE: 1.83 M2
BSA FOR ECHO PROCEDURE: 1.83 M2
CCTA ASCENDING AORTA: 3.4
CCTA SINUS: 3.5
CREAT BLD-MCNC: 1.1 MG/DL (ref 0.7–1.2)
EGFRCR SERPLBLD CKD-EPI 2021: >60 ML/MIN/1.73M2
LVEF ECHO: NORMAL

## 2025-03-26 PROCEDURE — 75580 N-INVAS EST C FFR SW ALY CTA: CPT

## 2025-03-26 PROCEDURE — 250N000011 HC RX IP 250 OP 636: Performed by: INTERNAL MEDICINE

## 2025-03-26 PROCEDURE — 93306 TTE W/DOPPLER COMPLETE: CPT

## 2025-03-26 PROCEDURE — 93306 TTE W/DOPPLER COMPLETE: CPT | Mod: 26 | Performed by: INTERNAL MEDICINE

## 2025-03-26 PROCEDURE — 82565 ASSAY OF CREATININE: CPT

## 2025-03-26 PROCEDURE — 75574 CT ANGIO HRT W/3D IMAGE: CPT | Mod: 26 | Performed by: GENERAL ACUTE CARE HOSPITAL

## 2025-03-26 PROCEDURE — 250N000013 HC RX MED GY IP 250 OP 250 PS 637: Performed by: INTERNAL MEDICINE

## 2025-03-26 PROCEDURE — 75580 N-INVAS EST C FFR SW ALY CTA: CPT | Mod: 26 | Performed by: GENERAL ACUTE CARE HOSPITAL

## 2025-03-26 PROCEDURE — 75574 CT ANGIO HRT W/3D IMAGE: CPT

## 2025-03-26 RX ORDER — IOPAMIDOL 755 MG/ML
100 INJECTION, SOLUTION INTRAVASCULAR ONCE
Status: COMPLETED | OUTPATIENT
Start: 2025-03-26 | End: 2025-03-26

## 2025-03-26 RX ADMIN — IOPAMIDOL 90 ML: 755 INJECTION, SOLUTION INTRAVENOUS at 08:10

## 2025-03-26 RX ADMIN — NITROGLYCERIN 0.4 MG: 0.4 TABLET, ORALLY DISINTEGRATING SUBLINGUAL at 08:00

## 2025-03-27 DIAGNOSIS — E11.9 TYPE 2 DIABETES MELLITUS WITHOUT COMPLICATION, WITHOUT LONG-TERM CURRENT USE OF INSULIN (H): ICD-10-CM

## 2025-03-27 DIAGNOSIS — I35.0 NONRHEUMATIC AORTIC VALVE STENOSIS: Primary | ICD-10-CM

## 2025-03-27 DIAGNOSIS — E78.00 HYPERCHOLESTEREMIA: ICD-10-CM

## 2025-04-23 ENCOUNTER — PATIENT OUTREACH (OUTPATIENT)
Dept: CARE COORDINATION | Facility: CLINIC | Age: 70
End: 2025-04-23
Payer: COMMERCIAL

## 2025-04-23 ENCOUNTER — TELEPHONE (OUTPATIENT)
Dept: FAMILY MEDICINE | Facility: CLINIC | Age: 70
End: 2025-04-23
Payer: COMMERCIAL

## 2025-04-23 NOTE — TELEPHONE ENCOUNTER
Patient Quality Outreach    Patient is due for the following:   Diabetes -  A1C and Foot Exam      Topic Date Due    Zoster (Shingles) Vaccine (1 of 2) Never done    Diptheria Tetanus Pertussis (DTAP/TDAP/TD) Vaccine (3 - Td or Tdap) 06/19/2024    Flu Vaccine (1) Never done    COVID-19 Vaccine (5 - 2024-25 season) 09/01/2024       Action(s) Taken:   Patient has upcoming appointment, these items will be addressed at that time.    Type of outreach:    Chart review performed, no outreach needed.    Questions for provider review:    None         Liana Alba MA  Chart routed to None.

## 2025-06-09 DIAGNOSIS — E11.9 TYPE 2 DIABETES MELLITUS WITHOUT COMPLICATION, WITHOUT LONG-TERM CURRENT USE OF INSULIN (H): ICD-10-CM

## 2025-06-09 RX ORDER — ATORVASTATIN CALCIUM 40 MG/1
40 TABLET, FILM COATED ORAL DAILY
Qty: 90 TABLET | Refills: 1 | Status: SHIPPED | OUTPATIENT
Start: 2025-06-09

## 2025-06-09 NOTE — TELEPHONE ENCOUNTER
Medication Question or Refill        What medication are you calling about (include dose and sig)?: metFORMIN (GLUCOPHAGE) 1000 MG tablet, atorvastatin (LIPITOR) 40 MG tablet     Preferred Pharmacy:   [id number] - [place], [place] - [address]  [address]  [place] [address]  Phone: [phone] Fax: [phone]      Controlled Substance Agreement on file:   CSA -- Patient Level:    CSA: None found at the patient level.       Who prescribed the medication?: Dr. Caldwell.    Do you need a refill? Yes    When did you use the medication last? Out of refills    Patient offered an appointment? No    Do you have any questions or concerns?  Yes: Patient will be going out of town tomorrow 6/10/25. Please send medication refill today per patient request.      Okay to leave a detailed message?: Yes at Cell number on file:    Telephone Information:   Mobile [phone]

## 2025-06-26 ENCOUNTER — TRANSFERRED RECORDS (OUTPATIENT)
Dept: HEALTH INFORMATION MANAGEMENT | Facility: CLINIC | Age: 70
End: 2025-06-26
Payer: COMMERCIAL

## 2025-06-26 LAB — RETINOPATHY: NEGATIVE

## 2025-07-03 ENCOUNTER — TRANSFERRED RECORDS (OUTPATIENT)
Dept: HEALTH INFORMATION MANAGEMENT | Facility: CLINIC | Age: 70
End: 2025-07-03
Payer: COMMERCIAL

## 2025-07-03 LAB — RETINOPATHY: NORMAL

## 2025-08-12 ENCOUNTER — OFFICE VISIT (OUTPATIENT)
Dept: FAMILY MEDICINE | Facility: CLINIC | Age: 70
End: 2025-08-12
Payer: COMMERCIAL

## 2025-08-12 VITALS
HEIGHT: 66 IN | RESPIRATION RATE: 16 BRPM | WEIGHT: 158.8 LBS | HEART RATE: 53 BPM | DIASTOLIC BLOOD PRESSURE: 64 MMHG | OXYGEN SATURATION: 100 % | TEMPERATURE: 98 F | BODY MASS INDEX: 25.52 KG/M2 | SYSTOLIC BLOOD PRESSURE: 108 MMHG

## 2025-08-12 DIAGNOSIS — M25.562 ACUTE PAIN OF LEFT KNEE: Primary | ICD-10-CM

## 2025-08-12 DIAGNOSIS — E78.2 MIXED HYPERLIPIDEMIA: ICD-10-CM

## 2025-08-12 DIAGNOSIS — E11.9 TYPE 2 DIABETES MELLITUS WITHOUT COMPLICATION, WITHOUT LONG-TERM CURRENT USE OF INSULIN (H): ICD-10-CM

## 2025-08-12 LAB
ALBUMIN SERPL BCG-MCNC: 4.3 G/DL (ref 3.5–5.2)
ALP SERPL-CCNC: 71 U/L (ref 40–150)
ALT SERPL W P-5'-P-CCNC: 22 U/L (ref 0–70)
ANION GAP SERPL CALCULATED.3IONS-SCNC: 6 MMOL/L (ref 7–15)
AST SERPL W P-5'-P-CCNC: 22 U/L (ref 0–45)
BILIRUB SERPL-MCNC: 0.5 MG/DL
BUN SERPL-MCNC: 15.4 MG/DL (ref 8–23)
CALCIUM SERPL-MCNC: 9.8 MG/DL (ref 8.8–10.4)
CHLORIDE SERPL-SCNC: 111 MMOL/L (ref 98–107)
CHOLEST SERPL-MCNC: 160 MG/DL
CREAT SERPL-MCNC: 1.16 MG/DL (ref 0.67–1.17)
EGFRCR SERPLBLD CKD-EPI 2021: 67 ML/MIN/1.73M2
EST. AVERAGE GLUCOSE BLD GHB EST-MCNC: 126 MG/DL
FASTING STATUS PATIENT QL REPORTED: YES
FASTING STATUS PATIENT QL REPORTED: YES
GLUCOSE SERPL-MCNC: 121 MG/DL (ref 70–99)
HBA1C MFR BLD: 6 % (ref 0–5.6)
HCO3 SERPL-SCNC: 26 MMOL/L (ref 22–29)
HDLC SERPL-MCNC: 46 MG/DL
LDLC SERPL CALC-MCNC: 97 MG/DL
NONHDLC SERPL-MCNC: 114 MG/DL
POTASSIUM SERPL-SCNC: 5.5 MMOL/L (ref 3.4–5.3)
PROT SERPL-MCNC: 7.1 G/DL (ref 6.4–8.3)
SODIUM SERPL-SCNC: 143 MMOL/L (ref 135–145)
TRIGL SERPL-MCNC: 83 MG/DL

## 2025-08-12 PROCEDURE — 99214 OFFICE O/P EST MOD 30 MIN: CPT | Performed by: FAMILY MEDICINE

## 2025-08-12 PROCEDURE — 80061 LIPID PANEL: CPT | Performed by: FAMILY MEDICINE

## 2025-08-12 PROCEDURE — G2211 COMPLEX E/M VISIT ADD ON: HCPCS | Performed by: FAMILY MEDICINE

## 2025-08-12 PROCEDURE — 80053 COMPREHEN METABOLIC PANEL: CPT | Performed by: FAMILY MEDICINE

## 2025-08-12 PROCEDURE — 3074F SYST BP LT 130 MM HG: CPT | Performed by: FAMILY MEDICINE

## 2025-08-12 PROCEDURE — 36415 COLL VENOUS BLD VENIPUNCTURE: CPT | Performed by: FAMILY MEDICINE

## 2025-08-12 PROCEDURE — 3078F DIAST BP <80 MM HG: CPT | Performed by: FAMILY MEDICINE

## 2025-08-12 PROCEDURE — 83036 HEMOGLOBIN GLYCOSYLATED A1C: CPT | Performed by: FAMILY MEDICINE

## 2025-08-12 PROCEDURE — 3044F HG A1C LEVEL LT 7.0%: CPT | Performed by: FAMILY MEDICINE

## 2025-08-14 ENCOUNTER — TELEPHONE (OUTPATIENT)
Dept: FAMILY MEDICINE | Facility: CLINIC | Age: 70
End: 2025-08-14
Payer: COMMERCIAL

## 2025-08-18 ENCOUNTER — TELEPHONE (OUTPATIENT)
Dept: FAMILY MEDICINE | Facility: CLINIC | Age: 70
End: 2025-08-18
Payer: COMMERCIAL

## 2025-08-18 DIAGNOSIS — Z76.0 ENCOUNTER FOR MEDICATION REFILL: ICD-10-CM

## 2025-08-18 DIAGNOSIS — E11.9 TYPE 2 DIABETES MELLITUS WITHOUT COMPLICATION, WITHOUT LONG-TERM CURRENT USE OF INSULIN (H): ICD-10-CM

## 2025-08-18 RX ORDER — BLOOD SUGAR DIAGNOSTIC
STRIP MISCELLANEOUS
Qty: 100 STRIP | Refills: 3 | Status: SHIPPED | OUTPATIENT
Start: 2025-08-18

## 2025-08-18 RX ORDER — BLOOD SUGAR DIAGNOSTIC
STRIP MISCELLANEOUS
Refills: 0 | OUTPATIENT
Start: 2025-08-18

## 2025-08-19 ENCOUNTER — HOSPITAL ENCOUNTER (OUTPATIENT)
Dept: MRI IMAGING | Facility: CLINIC | Age: 70
Discharge: HOME | End: 2025-08-19
Attending: FAMILY MEDICINE
Payer: COMMERCIAL

## 2025-08-19 DIAGNOSIS — M25.562 ACUTE PAIN OF LEFT KNEE: ICD-10-CM

## 2025-08-19 PROCEDURE — 73721 MRI JNT OF LWR EXTRE W/O DYE: CPT | Mod: LT

## 2025-08-24 ENCOUNTER — TELEPHONE (OUTPATIENT)
Dept: FAMILY MEDICINE | Facility: CLINIC | Age: 70
End: 2025-08-24
Payer: COMMERCIAL

## 2025-08-27 ENCOUNTER — RESULTS FOLLOW-UP (OUTPATIENT)
Dept: FAMILY MEDICINE | Facility: CLINIC | Age: 70
End: 2025-08-27
Payer: COMMERCIAL

## 2025-08-27 DIAGNOSIS — M23.352: Primary | ICD-10-CM

## 2025-08-28 ENCOUNTER — PATIENT OUTREACH (OUTPATIENT)
Dept: CARE COORDINATION | Facility: CLINIC | Age: 70
End: 2025-08-28
Payer: COMMERCIAL

## 2025-09-01 ENCOUNTER — PATIENT OUTREACH (OUTPATIENT)
Dept: CARE COORDINATION | Facility: CLINIC | Age: 70
End: 2025-09-01
Payer: COMMERCIAL